# Patient Record
Sex: FEMALE | Race: WHITE | NOT HISPANIC OR LATINO | Employment: UNEMPLOYED | URBAN - NONMETROPOLITAN AREA
[De-identification: names, ages, dates, MRNs, and addresses within clinical notes are randomized per-mention and may not be internally consistent; named-entity substitution may affect disease eponyms.]

---

## 2021-03-03 ENCOUNTER — OFFICE VISIT (OUTPATIENT)
Dept: FAMILY MEDICINE CLINIC | Facility: CLINIC | Age: 41
End: 2021-03-03

## 2021-03-03 ENCOUNTER — LAB (OUTPATIENT)
Dept: LAB | Facility: HOSPITAL | Age: 41
End: 2021-03-03

## 2021-03-03 ENCOUNTER — TELEPHONE (OUTPATIENT)
Dept: FAMILY MEDICINE CLINIC | Facility: CLINIC | Age: 41
End: 2021-03-03

## 2021-03-03 VITALS
HEART RATE: 76 BPM | SYSTOLIC BLOOD PRESSURE: 110 MMHG | DIASTOLIC BLOOD PRESSURE: 74 MMHG | OXYGEN SATURATION: 98 % | HEIGHT: 60 IN | WEIGHT: 124.8 LBS | BODY MASS INDEX: 24.5 KG/M2

## 2021-03-03 DIAGNOSIS — Z30.41 ENCOUNTER FOR SURVEILLANCE OF CONTRACEPTIVE PILLS: ICD-10-CM

## 2021-03-03 DIAGNOSIS — R21 PAPULAR RASH, LOCALIZED: ICD-10-CM

## 2021-03-03 DIAGNOSIS — F41.9 ANXIETY: ICD-10-CM

## 2021-03-03 DIAGNOSIS — G43.809 OTHER MIGRAINE WITHOUT STATUS MIGRAINOSUS, NOT INTRACTABLE: ICD-10-CM

## 2021-03-03 DIAGNOSIS — Z00.00 ANNUAL PHYSICAL EXAM: Primary | ICD-10-CM

## 2021-03-03 DIAGNOSIS — D23.9: ICD-10-CM

## 2021-03-03 DIAGNOSIS — Z12.31 ENCOUNTER FOR SCREENING MAMMOGRAM FOR MALIGNANT NEOPLASM OF BREAST: ICD-10-CM

## 2021-03-03 DIAGNOSIS — Z76.89 ENCOUNTER TO ESTABLISH CARE: ICD-10-CM

## 2021-03-03 DIAGNOSIS — Z00.00 ANNUAL PHYSICAL EXAM: ICD-10-CM

## 2021-03-03 DIAGNOSIS — R63.0 ANOREXIA: ICD-10-CM

## 2021-03-03 LAB
ALBUMIN SERPL-MCNC: 4.4 G/DL (ref 3.5–5.2)
ALBUMIN/GLOB SERPL: 2.1 G/DL
ALP SERPL-CCNC: 50 U/L (ref 39–117)
ALT SERPL W P-5'-P-CCNC: 13 U/L (ref 1–33)
ANION GAP SERPL CALCULATED.3IONS-SCNC: 8.5 MMOL/L (ref 5–15)
AST SERPL-CCNC: 18 U/L (ref 1–32)
BASOPHILS # BLD AUTO: 0.01 10*3/MM3 (ref 0–0.2)
BASOPHILS NFR BLD AUTO: 0.2 % (ref 0–1.5)
BILIRUB SERPL-MCNC: 0.7 MG/DL (ref 0–1.2)
BUN SERPL-MCNC: 16 MG/DL (ref 6–20)
BUN/CREAT SERPL: 20.5 (ref 7–25)
CALCIUM SPEC-SCNC: 8.8 MG/DL (ref 8.6–10.5)
CHLORIDE SERPL-SCNC: 102 MMOL/L (ref 98–107)
CHOLEST SERPL-MCNC: 164 MG/DL (ref 0–200)
CO2 SERPL-SCNC: 26.5 MMOL/L (ref 22–29)
CREAT SERPL-MCNC: 0.78 MG/DL (ref 0.57–1)
DEPRECATED RDW RBC AUTO: 40.2 FL (ref 37–54)
EOSINOPHIL # BLD AUTO: 0.05 10*3/MM3 (ref 0–0.4)
EOSINOPHIL NFR BLD AUTO: 1.1 % (ref 0.3–6.2)
ERYTHROCYTE [DISTWIDTH] IN BLOOD BY AUTOMATED COUNT: 12.2 % (ref 12.3–15.4)
GFR SERPL CREATININE-BSD FRML MDRD: 81 ML/MIN/1.73
GLOBULIN UR ELPH-MCNC: 2.1 GM/DL
GLUCOSE SERPL-MCNC: 86 MG/DL (ref 65–99)
HCT VFR BLD AUTO: 41 % (ref 34–46.6)
HDLC SERPL-MCNC: 88 MG/DL (ref 40–60)
HGB BLD-MCNC: 13.9 G/DL (ref 12–15.9)
IMM GRANULOCYTES # BLD AUTO: 0.02 10*3/MM3 (ref 0–0.05)
IMM GRANULOCYTES NFR BLD AUTO: 0.4 % (ref 0–0.5)
LDLC SERPL CALC-MCNC: 62 MG/DL (ref 0–100)
LDLC/HDLC SERPL: 0.7 {RATIO}
LYMPHOCYTES # BLD AUTO: 1.55 10*3/MM3 (ref 0.7–3.1)
LYMPHOCYTES NFR BLD AUTO: 34.4 % (ref 19.6–45.3)
MCH RBC QN AUTO: 31 PG (ref 26.6–33)
MCHC RBC AUTO-ENTMCNC: 33.9 G/DL (ref 31.5–35.7)
MCV RBC AUTO: 91.3 FL (ref 79–97)
MONOCYTES # BLD AUTO: 0.36 10*3/MM3 (ref 0.1–0.9)
MONOCYTES NFR BLD AUTO: 8 % (ref 5–12)
NEUTROPHILS NFR BLD AUTO: 2.52 10*3/MM3 (ref 1.7–7)
NEUTROPHILS NFR BLD AUTO: 55.9 % (ref 42.7–76)
NRBC BLD AUTO-RTO: 0 /100 WBC (ref 0–0.2)
PLATELET # BLD AUTO: 231 10*3/MM3 (ref 140–450)
PMV BLD AUTO: 9.8 FL (ref 6–12)
POTASSIUM SERPL-SCNC: 4.3 MMOL/L (ref 3.5–5.2)
PROT SERPL-MCNC: 6.5 G/DL (ref 6–8.5)
RBC # BLD AUTO: 4.49 10*6/MM3 (ref 3.77–5.28)
SODIUM SERPL-SCNC: 137 MMOL/L (ref 136–145)
TRIGL SERPL-MCNC: 74 MG/DL (ref 0–150)
VLDLC SERPL-MCNC: 14 MG/DL (ref 5–40)
WBC # BLD AUTO: 4.51 10*3/MM3 (ref 3.4–10.8)

## 2021-03-03 PROCEDURE — 36415 COLL VENOUS BLD VENIPUNCTURE: CPT

## 2021-03-03 PROCEDURE — 80053 COMPREHEN METABOLIC PANEL: CPT

## 2021-03-03 PROCEDURE — 99203 OFFICE O/P NEW LOW 30 MIN: CPT | Performed by: FAMILY MEDICINE

## 2021-03-03 PROCEDURE — 80061 LIPID PANEL: CPT

## 2021-03-03 PROCEDURE — 85025 COMPLETE CBC W/AUTO DIFF WBC: CPT

## 2021-03-03 RX ORDER — FLUOXETINE HYDROCHLORIDE 40 MG/1
40 CAPSULE ORAL DAILY
COMMUNITY
End: 2021-03-03 | Stop reason: SDUPTHER

## 2021-03-03 RX ORDER — SUMATRIPTAN 100 MG/1
100 TABLET, FILM COATED ORAL
Qty: 9 TABLET | Refills: 5 | Status: SHIPPED | OUTPATIENT
Start: 2021-03-03 | End: 2022-05-10 | Stop reason: SDUPTHER

## 2021-03-03 RX ORDER — FLUOXETINE HYDROCHLORIDE 40 MG/1
80 CAPSULE ORAL DAILY
Qty: 60 CAPSULE | Refills: 11 | Status: SHIPPED | OUTPATIENT
Start: 2021-03-03 | End: 2021-11-16

## 2021-03-03 RX ORDER — SUMATRIPTAN 100 MG/1
100 TABLET, FILM COATED ORAL
COMMUNITY
End: 2021-03-03 | Stop reason: SDUPTHER

## 2021-03-03 RX ORDER — ETHYNODIOL DIACETATE AND ETHINYL ESTRADIOL 1 MG-35MCG
1 KIT ORAL DAILY
Qty: 28 TABLET | Refills: 11 | Status: SHIPPED | OUTPATIENT
Start: 2021-03-03 | End: 2022-02-08 | Stop reason: SDUPTHER

## 2021-03-03 RX ORDER — TRIAMCINOLONE ACETONIDE 1 MG/G
CREAM TOPICAL 2 TIMES DAILY
Qty: 15 G | Refills: 1 | Status: SHIPPED | OUTPATIENT
Start: 2021-03-03 | End: 2021-11-16

## 2021-03-03 RX ORDER — ETHYNODIOL DIACETATE AND ETHINYL ESTRADIOL 1 MG-35MCG
1 KIT ORAL DAILY
COMMUNITY
End: 2021-03-03 | Stop reason: SDUPTHER

## 2021-03-03 NOTE — PROGRESS NOTES
Subjective   Chief Complaint   Patient presents with   • Establish Care   • Rash     chest area, between breasts     Yudith Urena is a 40 y.o. year old presenting to establish care as new patient.      Additional Concerns:    Patient presents today to [facility].  She has chronic medical problems including anorexia and migraine headaches.  She is currently taking Prozac to help with anxiety/anorexia symptoms, and this helps.  She still struggles at times.  Has been to outpatient therapy many times in the past, and this has been helpful.  In high school, she went to inpatient treatment center.  Migraine headaches are not very frequent at this time.  She uses Imitrex as needed.      Patient has skin rash under her breasts.  Notes that this started during pregnancy, and has been present ever since.  These sometimes become irritated, especially when patient has a lot of sweating.  She has seen dermatology before, and diagnosed with acantholytic acanthoma.  She would like a second opinion and discussion of possible treatment options.      Past Medical History:   Diagnosis Date   • Anorexia    • Anxiety    • Migraine        Past Surgical History:   Procedure Laterality Date   • KNEE SURGERY           Medications:  Current Outpatient Medications on File Prior to Visit   Medication Sig Dispense Refill   • ethynodiol-ethinyl estradiol (Kelnor 1/35) 1-35 MG-MCG per tablet Take 1 tablet by mouth Daily.     • FLUoxetine (PROzac) 40 MG capsule Take 40 mg by mouth Daily. Patient takes 2 tablets every morning     • SUMAtriptan (IMITREX) 100 MG tablet Take 100 mg by mouth Every 2 (Two) Hours As Needed for Migraine. Take one tablet at onset of headache. May repeat dose one time in 2 hours if headache not relieved.       No current facility-administered medications on file prior to visit.        Allergies   Allergen Reactions   • Acetaminophen Hives   • Iodinated Diagnostic Agents Rash       Family History   Problem  "Relation Age of Onset   • Diabetes Mother        Social History     Socioeconomic History   • Marital status:      Spouse name: Not on file   • Number of children: Not on file   • Years of education: Not on file   • Highest education level: Not on file   Tobacco Use   • Smoking status: Never Smoker   • Smokeless tobacco: Never Used   Substance and Sexual Activity   • Alcohol use: Yes     Frequency: 2-3 times a week     Lives in the home with her father,  and 4 year old daughter  LMP: 6 days ago  Menstrual Periods: Sometimes regular, misses periods  Current birth control: OCPs  Last pap smear was about 2 years - reported as normal.     Exercises regularly: yes, 5 days a week  Wears seat belt:yes.  Concerns about domestic violence: no.    Health Maintenance   Topic Date Due   • TDAP/TD VACCINES (1 - Tdap) 07/10/1997   • INFLUENZA VACCINE  08/01/2020   • PAP SMEAR  03/03/2021       Problem list was reviewed and updated as appropriate.       Objective     /74   Pulse 76   Ht 152.4 cm (60\")   Wt 56.6 kg (124 lb 12.8 oz)   LMP 02/27/2021 (Approximate)   SpO2 98%   BMI 24.37 kg/m²   Physical Exam  Vitals reviewed.   Constitutional:       General: She is not in acute distress.     Appearance: She is well-developed.   HENT:      Head: Normocephalic and atraumatic.   Cardiovascular:      Rate and Rhythm: Normal rate and regular rhythm.      Heart sounds: Normal heart sounds. No murmur heard.     Pulmonary:      Effort: Pulmonary effort is normal. No respiratory distress.      Breath sounds: Normal breath sounds. No wheezing or rales.   Abdominal:      Palpations: Abdomen is soft.      Tenderness: There is no abdominal tenderness.   Musculoskeletal:      Cervical back: Neck supple.   Skin:     General: Skin is warm and dry.      Findings: Rash (several, small erythematous papules present between breasts and around bra line) present.   Neurological:      Mental Status: She is alert and oriented to " person, place, and time.       Lab Review   Remote CMP and CBC from 08/09/2015 reviewed         Assessment/Plan   Diagnoses and all orders for this visit:    1. Annual physical exam (Primary)  -     Lipid Panel; Future  -     CBC & Differential; Future  -     Comprehensive Metabolic Panel; Future    2. Anxiety  -     FLUoxetine (PROzac) 40 MG capsule; Take 2 capsules by mouth Daily. Patient takes 2 tablets every morning  Dispense: 60 capsule; Refill: 11    3. Anorexia    4. Other migraine without status migrainosus, not intractable  -     SUMAtriptan (IMITREX) 100 MG tablet; Take 1 tablet by mouth Every 2 (Two) Hours As Needed for Migraine. Take one tablet at onset of headache. May repeat dose one time in 2 hours if headache persist  Dispense: 9 tablet; Refill: 5    5. Encounter for surveillance of contraceptive pills  -     ethynodiol-ethinyl estradiol (Kelnor 1/35) 1-35 MG-MCG per tablet; Take 1 tablet by mouth Daily.  Dispense: 28 tablet; Refill: 11    6. Encounter for screening mammogram for malignant neoplasm of breast  -     Mammo Screening Digital Tomosynthesis Bilateral With CAD; Future    7. Papular rash, localized  -     Ambulatory Referral to Dermatology  -     triamcinolone (KENALOG) 0.1 % cream; Apply  topically to the appropriate area as directed 2 (Two) Times a Day.  Dispense: 15 g; Refill: 1    8. Acantholytic acanthoma  -     Ambulatory Referral to Dermatology  -     triamcinolone (KENALOG) 0.1 % cream; Apply  topically to the appropriate area as directed 2 (Two) Times a Day.  Dispense: 15 g; Refill: 1    9. Encounter to establish care           Patient presents today to establish care.  Annual exam done today.  Patient's Body mass index is 24.37 kg/m². BMI is within normal parameters. No follow-up required.  She eats healthy diet and has regular exercise 5 days a week.  She works very hard to take care of herself.  She has struggled with anorexia her entire life.  Says that she is in a good place  right now.  Has recognized in the past when she needed additional help or support.  She takes Prozac, which helps with her anxiety and some of the fears she has regarding weight gain.  Will continue this medication.  Migraines infrequent, continue as needed Imitrex.  Papular rash between breast is bothersome.  Diagnosed with acantholytic acanthoma previously.  Would like to see local derm for additional opinion and discussion of treatment options.  Referral placed today.  Can trial triamcinolone cream to help with irritation in the meantime.  Patient dues for mammogram, ordered today.     Return in about 1 year (around 3/3/2022) for Annual physical.           This document has been electronically signed by Angela Holden MD

## 2021-03-04 NOTE — TELEPHONE ENCOUNTER
Please call and let patient know that annual labs look great.  Normal CBC and CMP.  Normal cholesterol, good cholesterol is really high.  ThanksMARK

## 2021-03-09 ENCOUNTER — TELEPHONE (OUTPATIENT)
Dept: FAMILY MEDICINE CLINIC | Facility: CLINIC | Age: 41
End: 2021-03-09

## 2021-03-09 DIAGNOSIS — M54.9 BACK PAIN, UNSPECIFIED BACK LOCATION, UNSPECIFIED BACK PAIN LATERALITY, UNSPECIFIED CHRONICITY: Primary | ICD-10-CM

## 2021-03-09 DIAGNOSIS — M25.511 RIGHT SHOULDER PAIN, UNSPECIFIED CHRONICITY: ICD-10-CM

## 2021-03-10 NOTE — TELEPHONE ENCOUNTER
Called and let patient know referral has been sent.  
PT CALLED AND SAID THAT HER INSURANCE THAT SHE HAS NOW REQUIRES A REFERRAL FOR PHYSICAL THERAPY FOR HER BACK AND RIGHT SHOULDER AND WANTED TO KNOW IF DR. DIAMOND WOULD PUT IN A REFERRAL AND FAX IT [phone] ATTN: SAHRA   
Sure.  Please let patient know that referral will be sent to [facility] rehab.  ThanksMARK  
EENMT

## 2021-04-08 ENCOUNTER — TELEPHONE (OUTPATIENT)
Dept: FAMILY MEDICINE CLINIC | Facility: CLINIC | Age: 41
End: 2021-04-08

## 2021-04-08 NOTE — TELEPHONE ENCOUNTER
Patient called and was wanting to know why she has been scheduled for additional imaging of her breasts.  She stated that she never got results from her first mammogram that was done on 03/24/21.  Please call patient ASAP and let her know what is going on.

## 2021-04-10 NOTE — TELEPHONE ENCOUNTER
Spoke with patient.  Discussed mammogram results.  Patient has already been contacted and set up diagnostic mammogram and ultrasound.  Let patient know typical procedure if spots are still concerning.  Will contact her once additional imaging results return.  Vicente Holden

## 2021-04-22 ENCOUNTER — TELEPHONE (OUTPATIENT)
Dept: FAMILY MEDICINE CLINIC | Facility: CLINIC | Age: 41
End: 2021-04-22

## 2021-04-22 NOTE — TELEPHONE ENCOUNTER
Please call and let patient know that diagnostic mammogram showed benign findings.  Plan to repeat in 1 year.  Thanks, MARK Holden

## 2021-04-23 NOTE — TELEPHONE ENCOUNTER
Per Dr. Holden, ms. Urena has been called with recent Bilateral Screening 3-D Mammogram results & recommendations.  Continue current medications and follow-up as planned or sooner if any problems.

## 2021-05-24 ENCOUNTER — TELEPHONE (OUTPATIENT)
Dept: FAMILY MEDICINE CLINIC | Facility: CLINIC | Age: 41
End: 2021-05-24

## 2021-05-24 DIAGNOSIS — R21 PAPULAR RASH, LOCALIZED: Primary | ICD-10-CM

## 2021-05-24 DIAGNOSIS — D23.9: ICD-10-CM

## 2021-07-07 ENCOUNTER — TELEPHONE (OUTPATIENT)
Dept: FAMILY MEDICINE CLINIC | Facility: CLINIC | Age: 41
End: 2021-07-07

## 2021-07-07 DIAGNOSIS — M54.9 BACK PAIN, UNSPECIFIED BACK LOCATION, UNSPECIFIED BACK PAIN LATERALITY, UNSPECIFIED CHRONICITY: Primary | ICD-10-CM

## 2021-07-07 NOTE — TELEPHONE ENCOUNTER
Needs to have another referral put in for [facility] for her ongoing back pain.   She said they have to have an renewed referral if it's been more than 30 days since her last visit to them.

## 2021-07-07 NOTE — TELEPHONE ENCOUNTER
Per Dr. Holden, Ms. Urena has been called and advise that the requested referral has been sent to [facility]

## 2021-08-16 ENCOUNTER — OFFICE VISIT (OUTPATIENT)
Dept: FAMILY MEDICINE CLINIC | Facility: CLINIC | Age: 41
End: 2021-08-16

## 2021-08-16 VITALS
HEIGHT: 60 IN | HEART RATE: 77 BPM | SYSTOLIC BLOOD PRESSURE: 118 MMHG | OXYGEN SATURATION: 97 % | WEIGHT: 129 LBS | DIASTOLIC BLOOD PRESSURE: 72 MMHG | BODY MASS INDEX: 25.32 KG/M2

## 2021-08-16 DIAGNOSIS — M54.6 CHRONIC BILATERAL THORACIC BACK PAIN: ICD-10-CM

## 2021-08-16 DIAGNOSIS — M62.830 SPASM OF MUSCLE OF LOWER BACK: ICD-10-CM

## 2021-08-16 DIAGNOSIS — G89.29 CHRONIC BILATERAL THORACIC BACK PAIN: ICD-10-CM

## 2021-08-16 DIAGNOSIS — G89.29 CHRONIC RIGHT SHOULDER PAIN: ICD-10-CM

## 2021-08-16 DIAGNOSIS — G89.29 CHRONIC BILATERAL LOW BACK PAIN WITH LEFT-SIDED SCIATICA: Primary | ICD-10-CM

## 2021-08-16 DIAGNOSIS — M54.16 LEFT LUMBAR RADICULOPATHY: ICD-10-CM

## 2021-08-16 DIAGNOSIS — M54.2 NECK PAIN: ICD-10-CM

## 2021-08-16 DIAGNOSIS — M25.511 CHRONIC RIGHT SHOULDER PAIN: ICD-10-CM

## 2021-08-16 DIAGNOSIS — M54.42 CHRONIC BILATERAL LOW BACK PAIN WITH LEFT-SIDED SCIATICA: Primary | ICD-10-CM

## 2021-08-16 PROCEDURE — 99213 OFFICE O/P EST LOW 20 MIN: CPT | Performed by: FAMILY MEDICINE

## 2021-08-16 RX ORDER — CYCLOBENZAPRINE HCL 5 MG
5-10 TABLET ORAL NIGHTLY PRN
Qty: 60 TABLET | Refills: 2 | Status: SHIPPED | OUTPATIENT
Start: 2021-08-16 | End: 2022-02-08

## 2021-08-16 NOTE — PROGRESS NOTES
"Chief Complaint  Back Pain and Joint Pain (ring finger on left hand, joints are painful)    Subjective          Yudith Urena presents to [facility]  History of Present Illness    Patient presents today with complaints of back pain.   This is a chronic problem for this patient.  Patient is involving entirety of the spine.  Has pain in the neck and shoulders, not too much muscle tension in the area.  Sometimes has an ache that radiates from the neck to the right shoulder.  She notes some degree of scoliosis.  She also has some radiation of low back pain into lower extremities, usually left greater than right.  Patient has history of abnormal MRI of lumbar spine in the past, many years ago (?spinal stenosis).  She did have consultation with neurosurgery, but opted for non-surgical management at the time.  Still would prefer no surgery.  She follows with physical therapy every 2 weeks now, and this helps some but pain is only relieved for short period of time.  She has tried epidural injections in the past, usually helped for only a couple of days.  Patient does not desire to be on any chronic pain medications.  She does take Advil PM every other day, as the medication helps with pain relief and sleep.  Sometimes pain interferes with daily activities and affects her moods.  Patient has multiple injuries and overuse related to years of gymnastics and exercise.      Objective   Vital Signs:   /72   Pulse 77   Ht 152.4 cm (60\")   Wt 58.5 kg (129 lb)   SpO2 97%   BMI 25.19 kg/m²     Physical Exam  Vitals reviewed.   Constitutional:       General: She is not in acute distress.     Appearance: She is well-developed.   HENT:      Head: Normocephalic and atraumatic.   Cardiovascular:      Rate and Rhythm: Normal rate and regular rhythm.      Heart sounds: Normal heart sounds. No murmur heard.     Pulmonary:      Effort: Pulmonary effort is normal. No respiratory distress.      " Breath sounds: Normal breath sounds. No wheezing or rales.   Musculoskeletal:      Cervical back: No spasms or tenderness. Normal range of motion.      Thoracic back: Spasms present. No tenderness.      Lumbar back: Spasms and tenderness present.   Skin:     General: Skin is warm and dry.      Findings: No rash.   Neurological:      Mental Status: She is alert and oriented to person, place, and time.        Result Review :   The following data was reviewed by: Angela Holden MD on 08/16/2021:  Common labs    Common Labsle 3/3/21 3/3/21 3/3/21    1022 1022 1022   Glucose   86   BUN   16   Creatinine   0.78   eGFR Non African Am   81   Sodium   137   Potassium   4.3   Chloride   102   Calcium   8.8   Albumin   4.40   Total Bilirubin   0.7   Alkaline Phosphatase   50   AST (SGOT)   18   ALT (SGPT)   13   WBC 4.51     Hemoglobin 13.9     Hematocrit 41.0     Platelets 231     Total Cholesterol  164    Triglycerides  74    HDL Cholesterol  88 (A)    LDL Cholesterol   62    (A) Abnormal value                         Assessment and Plan    Diagnoses and all orders for this visit:    1. Chronic bilateral low back pain with left-sided sciatica (Primary)  -     XR Spine Lumbar 4+ View; Future    2. Left lumbar radiculopathy  -     XR Spine Lumbar 4+ View; Future    3. Spasm of muscle of lower back  -     cyclobenzaprine (FLEXERIL) 5 MG tablet; Take 1-2 tablets by mouth At Night As Needed for Muscle Spasms.  Dispense: 60 tablet; Refill: 2    4. Chronic bilateral thoracic back pain  -     XR Spine Thoracic 3 View (In Office); Future    5. Neck pain  -     XR Spine Cervical 2 or 3 View; Future    6. Chronic right shoulder pain      Patient seen today with significant back pain symptoms, chronic  Pain in all regions of back, worse in cervical and lumbar  Interfering with patient's daily activities and sleep  Only getting short term relief with physical therapy every 2 weeks  She would like to try non surgical options  Will get  Xrays today, plan for MRI of the spine for additional evaluation  Patient may need neurosurgical evaluation based on imaging  If no surgery needed/indicated, patient would likely benefit from pain management to discuss options for treatment of pain  Patient will trial flexeril again at bedtime, 5-10 mg nightly given significant muscle spasm  Concern for cervical radiculopathy causing right shoulder pain  Concern for lumbar radiculopathy causing left lower extremity pain    Patient also has swelling of joints of finger requiring her to wear her wedding ring on opposite hand  Family history of osteoarthritis, no RA that she knows of   Will monitor for the next couple of weeks   Call with worsening/persistent symptoms and labs can be ordered for joint pains (JAYLEEN and Rheumatoid factor)      Follow Up   Return if symptoms worsen or fail to improve, for Next scheduled follow up.  Patient was given instructions and counseling regarding her condition or for health maintenance advice. Please see specific information pulled into the AVS if appropriate.         This document has been electronically signed by Angela Holden MD

## 2021-08-17 ENCOUNTER — TELEPHONE (OUTPATIENT)
Dept: FAMILY MEDICINE CLINIC | Facility: CLINIC | Age: 41
End: 2021-08-17

## 2021-08-17 DIAGNOSIS — M54.42 CHRONIC BILATERAL LOW BACK PAIN WITH LEFT-SIDED SCIATICA: Primary | ICD-10-CM

## 2021-08-17 DIAGNOSIS — G89.29 CHRONIC BILATERAL LOW BACK PAIN WITH LEFT-SIDED SCIATICA: Primary | ICD-10-CM

## 2021-08-17 DIAGNOSIS — M54.6 CHRONIC BILATERAL THORACIC BACK PAIN: ICD-10-CM

## 2021-08-17 DIAGNOSIS — G89.29 CHRONIC BILATERAL THORACIC BACK PAIN: ICD-10-CM

## 2021-08-17 DIAGNOSIS — M25.511 CHRONIC RIGHT SHOULDER PAIN: ICD-10-CM

## 2021-08-17 DIAGNOSIS — G89.29 CHRONIC RIGHT SHOULDER PAIN: ICD-10-CM

## 2021-08-17 DIAGNOSIS — M54.16 LEFT LUMBAR RADICULOPATHY: ICD-10-CM

## 2021-08-17 DIAGNOSIS — M54.2 NECK PAIN: ICD-10-CM

## 2021-08-17 NOTE — TELEPHONE ENCOUNTER
Please let patient know that as expected, Xrays showed degenerative changes.  Orders for MRI of the back have been placed.  She will be contacted for scheduling.  Thanks, MARK Holden

## 2021-08-25 ENCOUNTER — HOSPITAL ENCOUNTER (OUTPATIENT)
Dept: MRI IMAGING | Facility: HOSPITAL | Age: 41
Discharge: HOME | End: 2021-08-25

## 2021-08-25 DIAGNOSIS — M54.42 CHRONIC BILATERAL LOW BACK PAIN WITH LEFT-SIDED SCIATICA: ICD-10-CM

## 2021-08-25 DIAGNOSIS — G89.29 CHRONIC RIGHT SHOULDER PAIN: ICD-10-CM

## 2021-08-25 DIAGNOSIS — G89.29 CHRONIC BILATERAL LOW BACK PAIN WITH LEFT-SIDED SCIATICA: ICD-10-CM

## 2021-08-25 DIAGNOSIS — M25.511 CHRONIC RIGHT SHOULDER PAIN: ICD-10-CM

## 2021-08-25 DIAGNOSIS — M54.6 CHRONIC BILATERAL THORACIC BACK PAIN: ICD-10-CM

## 2021-08-25 DIAGNOSIS — G89.29 CHRONIC BILATERAL THORACIC BACK PAIN: ICD-10-CM

## 2021-08-25 DIAGNOSIS — M54.2 NECK PAIN: ICD-10-CM

## 2021-08-25 DIAGNOSIS — M54.16 LEFT LUMBAR RADICULOPATHY: ICD-10-CM

## 2021-08-25 PROCEDURE — 72141 MRI NECK SPINE W/O DYE: CPT

## 2021-08-25 PROCEDURE — 72148 MRI LUMBAR SPINE W/O DYE: CPT

## 2021-08-25 PROCEDURE — 72146 MRI CHEST SPINE W/O DYE: CPT

## 2021-08-26 ENCOUNTER — TELEPHONE (OUTPATIENT)
Dept: FAMILY MEDICINE CLINIC | Facility: CLINIC | Age: 41
End: 2021-08-26

## 2021-08-26 DIAGNOSIS — M48.02 CERVICAL SPINAL STENOSIS: ICD-10-CM

## 2021-08-26 DIAGNOSIS — M54.6 CHRONIC BILATERAL THORACIC BACK PAIN: ICD-10-CM

## 2021-08-26 DIAGNOSIS — M54.42 CHRONIC BILATERAL LOW BACK PAIN WITH LEFT-SIDED SCIATICA: Primary | ICD-10-CM

## 2021-08-26 DIAGNOSIS — K76.9 LIVER LESION: ICD-10-CM

## 2021-08-26 DIAGNOSIS — M54.2 NECK PAIN: ICD-10-CM

## 2021-08-26 DIAGNOSIS — G89.29 CHRONIC BILATERAL THORACIC BACK PAIN: ICD-10-CM

## 2021-08-26 DIAGNOSIS — M47.816 FACET ARTHROPATHY, LUMBAR: ICD-10-CM

## 2021-08-26 DIAGNOSIS — G89.29 CHRONIC BILATERAL LOW BACK PAIN WITH LEFT-SIDED SCIATICA: Primary | ICD-10-CM

## 2021-08-26 DIAGNOSIS — M54.16 LEFT LUMBAR RADICULOPATHY: ICD-10-CM

## 2021-08-26 NOTE — TELEPHONE ENCOUNTER
Yudith received MRI results and would like a call back regarding what her next steps will need to be.     [phone]

## 2021-08-26 NOTE — TELEPHONE ENCOUNTER
Spoke to patient about MRI results.  Has several areas of disc protrusion and facet arthropathy.  There is some foraminal stenosis compression the L5 nerve and moderate central spinal canal stenosis, contour changes of the cord and foraminal stenosis of C5/6 and C6/7.  Would recommend referral to neurosurgery for discussion of surgical options, patient would like to avoid surgery if possible.  Also referral to pain management for discussion of injections.  Patient does not wish to be on chronic pain medication.  Incidental finding of liver cystic lesion, ultrasound ordered for additional evaluation of this.   Vicente Holden

## 2021-09-13 ENCOUNTER — TELEPHONE (OUTPATIENT)
Dept: FAMILY MEDICINE CLINIC | Facility: CLINIC | Age: 41
End: 2021-09-13

## 2021-09-13 NOTE — TELEPHONE ENCOUNTER
Yudith said, she was referred to [facility] in [place] over 2 weeks ago and no one has called her and she can't get anyone when she calls them. Said, her Insurance wants her to see someone in state too.

## 2021-09-13 NOTE — TELEPHONE ENCOUNTER
Ok, we can change to provider at [facility] in [place]. Thanks, MARK Holden

## 2021-09-16 ENCOUNTER — HOSPITAL ENCOUNTER (OUTPATIENT)
Dept: ULTRASOUND IMAGING | Facility: HOSPITAL | Age: 41
Discharge: HOME | End: 2021-09-16
Admitting: FAMILY MEDICINE

## 2021-09-16 DIAGNOSIS — K76.9 LIVER LESION: ICD-10-CM

## 2021-09-16 PROCEDURE — 76705 ECHO EXAM OF ABDOMEN: CPT

## 2021-09-17 ENCOUNTER — TELEPHONE (OUTPATIENT)
Dept: FAMILY MEDICINE CLINIC | Facility: CLINIC | Age: 41
End: 2021-09-17

## 2021-09-17 NOTE — TELEPHONE ENCOUNTER
PT WOULD LIKE CALL BACK TO DISCUSS US RESULTS. CALLED EARLIER BUT DID NOT REC'D CALL BACK AND DOESN'T WANT TO STRESS ALL WEEKEND.    PLEASE CALL    [phone]

## 2021-09-17 NOTE — TELEPHONE ENCOUNTER
Patient received her results on her liver ultrasound that was done on 9/16/21 and she is concerned and would like to speak to someone in regard of her concern

## 2021-09-20 ENCOUNTER — TELEPHONE (OUTPATIENT)
Dept: FAMILY MEDICINE CLINIC | Facility: CLINIC | Age: 41
End: 2021-09-20

## 2021-09-20 ENCOUNTER — LAB (OUTPATIENT)
Dept: LAB | Facility: HOSPITAL | Age: 41
End: 2021-09-20

## 2021-09-20 DIAGNOSIS — R93.421 ABNORMAL ULTRASOUND OF RIGHT KIDNEY: ICD-10-CM

## 2021-09-20 DIAGNOSIS — R93.421 ABNORMAL ULTRASOUND OF RIGHT KIDNEY: Primary | ICD-10-CM

## 2021-09-20 PROCEDURE — 80069 RENAL FUNCTION PANEL: CPT

## 2021-09-20 PROCEDURE — 36415 COLL VENOUS BLD VENIPUNCTURE: CPT

## 2021-09-20 PROCEDURE — 81001 URINALYSIS AUTO W/SCOPE: CPT

## 2021-09-20 NOTE — TELEPHONE ENCOUNTER
Spoke to patient on 9/17/2021 about concerns.  Benign hemangioma in the liver, no follow-up needed.  Abnormal appearance to the kidney.  Will call back Monday with further instruction for evaluation of this.  Thanks, MARK Holden

## 2021-09-21 ENCOUNTER — TELEPHONE (OUTPATIENT)
Dept: FAMILY MEDICINE CLINIC | Facility: CLINIC | Age: 41
End: 2021-09-21

## 2021-09-21 LAB
ALBUMIN SERPL-MCNC: 4.7 G/DL (ref 3.5–5.2)
ANION GAP SERPL CALCULATED.3IONS-SCNC: 10.9 MMOL/L (ref 5–15)
BACTERIA UR QL AUTO: NORMAL /HPF
BILIRUB UR QL STRIP: NEGATIVE
BUN SERPL-MCNC: 14 MG/DL (ref 6–20)
BUN/CREAT SERPL: 25.5 (ref 7–25)
CALCIUM SPEC-SCNC: 9.1 MG/DL (ref 8.6–10.5)
CHLORIDE SERPL-SCNC: 100 MMOL/L (ref 98–107)
CLARITY UR: CLEAR
CO2 SERPL-SCNC: 25.1 MMOL/L (ref 22–29)
COLOR UR: YELLOW
CREAT SERPL-MCNC: 0.55 MG/DL (ref 0.57–1)
GFR SERPL CREATININE-BSD FRML MDRD: 121 ML/MIN/1.73
GLUCOSE SERPL-MCNC: 81 MG/DL (ref 65–99)
GLUCOSE UR STRIP-MCNC: NEGATIVE MG/DL
HGB UR QL STRIP.AUTO: NEGATIVE
HYALINE CASTS UR QL AUTO: NORMAL /LPF
KETONES UR QL STRIP: NEGATIVE
LEUKOCYTE ESTERASE UR QL STRIP.AUTO: NEGATIVE
NITRITE UR QL STRIP: NEGATIVE
PH UR STRIP.AUTO: 7 [PH] (ref 5–8)
PHOSPHATE SERPL-MCNC: 2.8 MG/DL (ref 2.5–4.5)
POTASSIUM SERPL-SCNC: 4.5 MMOL/L (ref 3.5–5.2)
PROT UR QL STRIP: NEGATIVE
RBC # UR: NORMAL /HPF
REF LAB TEST METHOD: NORMAL
SODIUM SERPL-SCNC: 136 MMOL/L (ref 136–145)
SP GR UR STRIP: 1.01 (ref 1–1.03)
SQUAMOUS #/AREA URNS HPF: NORMAL /HPF
UROBILINOGEN UR QL STRIP: NORMAL
WBC UR QL AUTO: NORMAL /HPF

## 2021-09-21 NOTE — TELEPHONE ENCOUNTER
Please let patient know that kidney function and urinalysis are normal.  Will call with results on ultrasound once this is done.  ThanksMARK

## 2021-09-23 ENCOUNTER — HOSPITAL ENCOUNTER (OUTPATIENT)
Dept: ULTRASOUND IMAGING | Facility: HOSPITAL | Age: 41
Discharge: HOME | End: 2021-09-23
Admitting: FAMILY MEDICINE

## 2021-09-23 ENCOUNTER — TELEPHONE (OUTPATIENT)
Dept: FAMILY MEDICINE CLINIC | Facility: CLINIC | Age: 41
End: 2021-09-23

## 2021-09-23 DIAGNOSIS — R93.421 ABNORMAL ULTRASOUND OF RIGHT KIDNEY: ICD-10-CM

## 2021-09-23 DIAGNOSIS — R93.421 ABNORMAL ULTRASOUND OF RIGHT KIDNEY: Primary | ICD-10-CM

## 2021-09-23 PROCEDURE — 76775 US EXAM ABDO BACK WALL LIM: CPT

## 2021-09-23 NOTE — TELEPHONE ENCOUNTER
Left message letting patient know that renal ultrasound was normal.  This with normal kidney function is reassuring that she does not have any renal issues.  Would recommend that we recheck labs in at least 6 months, and lab order has been placed.  Advised that she can call with additional questions/concerns.  Thanks, MARK Holden

## 2021-10-19 ENCOUNTER — TELEPHONE (OUTPATIENT)
Dept: FAMILY MEDICINE CLINIC | Facility: CLINIC | Age: 41
End: 2021-10-19

## 2021-10-19 DIAGNOSIS — G89.29 CHRONIC BILATERAL LOW BACK PAIN WITH LEFT-SIDED SCIATICA: Primary | ICD-10-CM

## 2021-10-19 DIAGNOSIS — M54.42 CHRONIC BILATERAL LOW BACK PAIN WITH LEFT-SIDED SCIATICA: Primary | ICD-10-CM

## 2021-11-16 ENCOUNTER — LAB (OUTPATIENT)
Dept: LAB | Facility: HOSPITAL | Age: 41
End: 2021-11-16

## 2021-11-16 ENCOUNTER — TRANSCRIBE ORDERS (OUTPATIENT)
Dept: LAB | Facility: HOSPITAL | Age: 41
End: 2021-11-16

## 2021-11-16 ENCOUNTER — TRANSCRIBE ORDERS (OUTPATIENT)
Dept: CARDIOLOGY | Facility: CLINIC | Age: 41
End: 2021-11-16

## 2021-11-16 ENCOUNTER — OFFICE VISIT (OUTPATIENT)
Dept: FAMILY MEDICINE CLINIC | Facility: CLINIC | Age: 41
End: 2021-11-16

## 2021-11-16 ENCOUNTER — HOSPITAL ENCOUNTER (OUTPATIENT)
Dept: GENERAL RADIOLOGY | Facility: HOSPITAL | Age: 41
Discharge: HOME | End: 2021-11-16

## 2021-11-16 ENCOUNTER — TRANSCRIBE ORDERS (OUTPATIENT)
Dept: LAB | Facility: OTHER | Age: 41
End: 2021-11-16

## 2021-11-16 VITALS
OXYGEN SATURATION: 97 % | DIASTOLIC BLOOD PRESSURE: 80 MMHG | WEIGHT: 131 LBS | HEART RATE: 88 BPM | HEIGHT: 60 IN | SYSTOLIC BLOOD PRESSURE: 106 MMHG | BODY MASS INDEX: 25.72 KG/M2

## 2021-11-16 DIAGNOSIS — F41.9 ANXIETY: Primary | ICD-10-CM

## 2021-11-16 DIAGNOSIS — G89.29 CHRONIC BILATERAL LOW BACK PAIN, UNSPECIFIED WHETHER SCIATICA PRESENT: ICD-10-CM

## 2021-11-16 DIAGNOSIS — Z01.818 PRE-OP TESTING: Primary | ICD-10-CM

## 2021-11-16 DIAGNOSIS — Z01.818 PREOP EXAMINATION: ICD-10-CM

## 2021-11-16 DIAGNOSIS — M43.16 SPONDYLOLISTHESIS OF LUMBAR REGION: ICD-10-CM

## 2021-11-16 DIAGNOSIS — Z01.818 PREOP EXAMINATION: Primary | ICD-10-CM

## 2021-11-16 DIAGNOSIS — Z01.818 PRE-OP TESTING: ICD-10-CM

## 2021-11-16 DIAGNOSIS — M54.50 CHRONIC BILATERAL LOW BACK PAIN, UNSPECIFIED WHETHER SCIATICA PRESENT: ICD-10-CM

## 2021-11-16 DIAGNOSIS — M89.8X1 CLAVICLE PAIN: ICD-10-CM

## 2021-11-16 DIAGNOSIS — M54.16 LEFT LUMBAR RADICULOPATHY: ICD-10-CM

## 2021-11-16 LAB
ANION GAP SERPL CALCULATED.3IONS-SCNC: 10.1 MMOL/L (ref 5–15)
APTT PPP: 28.6 SECONDS (ref 20–40.3)
BASOPHILS # BLD AUTO: 0.03 10*3/MM3 (ref 0–0.2)
BASOPHILS NFR BLD AUTO: 0.4 % (ref 0–1.5)
BUN SERPL-MCNC: 14 MG/DL (ref 6–20)
BUN/CREAT SERPL: 20.3 (ref 7–25)
CALCIUM SPEC-SCNC: 9.2 MG/DL (ref 8.6–10.5)
CHLORIDE SERPL-SCNC: 104 MMOL/L (ref 98–107)
CO2 SERPL-SCNC: 24.9 MMOL/L (ref 22–29)
CREAT SERPL-MCNC: 0.69 MG/DL (ref 0.57–1)
DEPRECATED RDW RBC AUTO: 43.3 FL (ref 37–54)
EOSINOPHIL # BLD AUTO: 0.09 10*3/MM3 (ref 0–0.4)
EOSINOPHIL NFR BLD AUTO: 1.3 % (ref 0.3–6.2)
ERYTHROCYTE [DISTWIDTH] IN BLOOD BY AUTOMATED COUNT: 12.4 % (ref 12.3–15.4)
ERYTHROCYTE [SEDIMENTATION RATE] IN BLOOD: 3 MM/HR (ref 0–20)
GFR SERPL CREATININE-BSD FRML MDRD: 93 ML/MIN/1.73
GLUCOSE SERPL-MCNC: 69 MG/DL (ref 65–99)
HCT VFR BLD AUTO: 44.4 % (ref 34–46.6)
HGB BLD-MCNC: 14.1 G/DL (ref 12–15.9)
IMM GRANULOCYTES # BLD AUTO: 0.02 10*3/MM3 (ref 0–0.05)
IMM GRANULOCYTES NFR BLD AUTO: 0.3 % (ref 0–0.5)
INR PPP: 0.91 (ref 0.8–1.2)
LYMPHOCYTES # BLD AUTO: 1.73 10*3/MM3 (ref 0.7–3.1)
LYMPHOCYTES NFR BLD AUTO: 24.7 % (ref 19.6–45.3)
MCH RBC QN AUTO: 29.9 PG (ref 26.6–33)
MCHC RBC AUTO-ENTMCNC: 31.8 G/DL (ref 31.5–35.7)
MCV RBC AUTO: 94.3 FL (ref 79–97)
MONOCYTES # BLD AUTO: 0.5 10*3/MM3 (ref 0.1–0.9)
MONOCYTES NFR BLD AUTO: 7.2 % (ref 5–12)
NEUTROPHILS NFR BLD AUTO: 4.62 10*3/MM3 (ref 1.7–7)
NEUTROPHILS NFR BLD AUTO: 66.1 % (ref 42.7–76)
NRBC BLD AUTO-RTO: 0.1 /100 WBC (ref 0–0.2)
PLATELET # BLD AUTO: 258 10*3/MM3 (ref 140–450)
PMV BLD AUTO: 10.3 FL (ref 6–12)
POTASSIUM SERPL-SCNC: 4 MMOL/L (ref 3.5–5.2)
PROTHROMBIN TIME: 12.2 SECONDS (ref 11.1–15.3)
RBC # BLD AUTO: 4.71 10*6/MM3 (ref 3.77–5.28)
SODIUM SERPL-SCNC: 139 MMOL/L (ref 136–145)
WBC # BLD AUTO: 6.99 10*3/MM3 (ref 3.4–10.8)

## 2021-11-16 PROCEDURE — 71046 X-RAY EXAM CHEST 2 VIEWS: CPT

## 2021-11-16 PROCEDURE — 80048 BASIC METABOLIC PNL TOTAL CA: CPT

## 2021-11-16 PROCEDURE — 36415 COLL VENOUS BLD VENIPUNCTURE: CPT

## 2021-11-16 PROCEDURE — 85730 THROMBOPLASTIN TIME PARTIAL: CPT

## 2021-11-16 PROCEDURE — 85610 PROTHROMBIN TIME: CPT

## 2021-11-16 PROCEDURE — 85652 RBC SED RATE AUTOMATED: CPT

## 2021-11-16 PROCEDURE — 85025 COMPLETE CBC W/AUTO DIFF WBC: CPT

## 2021-11-16 PROCEDURE — 93000 ELECTROCARDIOGRAM COMPLETE: CPT | Performed by: INTERNAL MEDICINE

## 2021-11-16 PROCEDURE — 99213 OFFICE O/P EST LOW 20 MIN: CPT | Performed by: FAMILY MEDICINE

## 2021-11-16 RX ORDER — FLUOXETINE HYDROCHLORIDE 20 MG/1
20 CAPSULE ORAL DAILY
Qty: 90 CAPSULE | Refills: 1 | Status: SHIPPED | OUTPATIENT
Start: 2021-11-16 | End: 2022-05-16

## 2021-11-17 ENCOUNTER — PATIENT MESSAGE (OUTPATIENT)
Dept: FAMILY MEDICINE CLINIC | Facility: CLINIC | Age: 41
End: 2021-11-17

## 2021-11-30 LAB
QT INTERVAL: 378 MS
QTC INTERVAL: 425 MS

## 2022-01-10 ENCOUNTER — TELEPHONE (OUTPATIENT)
Dept: FAMILY MEDICINE CLINIC | Facility: CLINIC | Age: 42
End: 2022-01-10

## 2022-01-10 DIAGNOSIS — M25.511 CHRONIC RIGHT SHOULDER PAIN: Primary | ICD-10-CM

## 2022-01-10 DIAGNOSIS — G89.29 CHRONIC RIGHT SHOULDER PAIN: Primary | ICD-10-CM

## 2022-02-08 ENCOUNTER — OFFICE VISIT (OUTPATIENT)
Dept: FAMILY MEDICINE CLINIC | Facility: CLINIC | Age: 42
End: 2022-02-08

## 2022-02-08 VITALS
OXYGEN SATURATION: 98 % | HEIGHT: 60 IN | SYSTOLIC BLOOD PRESSURE: 122 MMHG | HEART RATE: 95 BPM | BODY MASS INDEX: 25.58 KG/M2 | RESPIRATION RATE: 18 BRPM | DIASTOLIC BLOOD PRESSURE: 80 MMHG

## 2022-02-08 DIAGNOSIS — M89.8X1 CLAVICLE PAIN: ICD-10-CM

## 2022-02-08 DIAGNOSIS — G89.29 CHRONIC BILATERAL LOW BACK PAIN, UNSPECIFIED WHETHER SCIATICA PRESENT: Primary | ICD-10-CM

## 2022-02-08 DIAGNOSIS — Z30.41 ENCOUNTER FOR SURVEILLANCE OF CONTRACEPTIVE PILLS: ICD-10-CM

## 2022-02-08 DIAGNOSIS — M54.50 CHRONIC BILATERAL LOW BACK PAIN, UNSPECIFIED WHETHER SCIATICA PRESENT: Primary | ICD-10-CM

## 2022-02-08 DIAGNOSIS — F41.9 ANXIETY: ICD-10-CM

## 2022-02-08 PROCEDURE — 99213 OFFICE O/P EST LOW 20 MIN: CPT | Performed by: FAMILY MEDICINE

## 2022-02-08 RX ORDER — MELATONIN
1000
COMMUNITY

## 2022-02-08 RX ORDER — CYCLOBENZAPRINE HCL 10 MG
TABLET ORAL
COMMUNITY
Start: 2021-12-10 | End: 2022-05-16 | Stop reason: SDUPTHER

## 2022-02-08 RX ORDER — ETHYNODIOL DIACETATE AND ETHINYL ESTRADIOL 1 MG-35MCG
1 KIT ORAL DAILY
Qty: 28 TABLET | Refills: 11 | Status: SHIPPED | OUTPATIENT
Start: 2022-02-08 | End: 2023-01-30

## 2022-02-08 RX ORDER — SUMATRIPTAN 100 MG/1
33-100 TABLET, FILM COATED ORAL
COMMUNITY
Start: 2021-09-22 | End: 2022-02-08

## 2022-02-08 NOTE — PROGRESS NOTES
"Chief Complaint  Follow-up and Med Refill    Subjective          Yudith Urena presents to [facility] [place]  History of Present Illness    Patient seen today for follow up.  Doing well.  She had surgery on her lumbar spine, significant improvement in pain and lower extremity symptoms since this time.  She continues to use flexeril as needed.  Has started getting back into physical activity gradually.  She has weaned herself down to Prozac 20 mg daily, and feels like she is doing well on this dose.    Objective   Vital Signs:   /80   Pulse 95   Resp 18   Ht 152.4 cm (60\")   SpO2 98%   BMI 25.58 kg/m²     Physical Exam  Vitals reviewed.   Constitutional:       General: She is not in acute distress.     Appearance: She is well-developed.   Cardiovascular:      Rate and Rhythm: Normal rate and regular rhythm.      Heart sounds: Normal heart sounds. No murmur heard.      Pulmonary:      Effort: Pulmonary effort is normal. No respiratory distress.      Breath sounds: Normal breath sounds. No wheezing or rales.   Skin:     General: Skin is warm and dry.   Neurological:      Mental Status: She is alert and oriented to person, place, and time.        Result Review :   The following data was reviewed by: Angela Holden MD on 02/08/2022:  Common labs    Common Labsle 9/20/21 11/16/21 11/16/21     1109 1109   Glucose 81  69   BUN 14  14   Creatinine 0.55 (A)  0.69   eGFR Non African Am 121  93   Sodium 136  139   Potassium 4.5  4.0   Chloride 100  104   Calcium 9.1  9.2   Albumin 4.70     WBC  6.99    Hemoglobin  14.1    Hematocrit  44.4    Platelets  258    (A) Abnormal value                      Assessment and Plan    Diagnoses and all orders for this visit:    1. Chronic bilateral low back pain, unspecified whether sciatica present (Primary)    2. Anxiety    3. Clavicle pain    4. Encounter for surveillance of contraceptive pills  -     ethynodiol-ethinyl " estradiol (Kelnor 1/35) 1-35 MG-MCG per tablet; Take 1 tablet by mouth Daily.  Dispense: 28 tablet; Refill: 11      Back pain and lumbar radiculopathy improved since surgical intervention, no new concerns  Working with physical therapy on clavicle pain coming from rotated right clavicle  Anxiety controlled with Prozac 20 mg daily  Refill of contraceptive provided today  Patient due for annual physical and pap smear - plan to complete at next visit          Follow Up   Return in about 6 months (around 8/8/2022) for Annual physical with pap smear.  Patient was given instructions and counseling regarding her condition or for health maintenance advice. Please see specific information pulled into the AVS if appropriate.           This document has been electronically signed by Angela Holden MD

## 2022-05-03 ENCOUNTER — TELEPHONE (OUTPATIENT)
Dept: FAMILY MEDICINE CLINIC | Facility: CLINIC | Age: 42
End: 2022-05-03

## 2022-05-03 DIAGNOSIS — Z00.00 ANNUAL PHYSICAL EXAM: Primary | ICD-10-CM

## 2022-05-03 NOTE — TELEPHONE ENCOUNTER
Please let patient know if you want her to do labs before her appt or the day of appt. There are no orders in the patient [phone]

## 2022-05-03 NOTE — TELEPHONE ENCOUNTER
Next Cele OV 05/16/2022    The only active order I see is for a Renal Function Panel ordered on 09/23/2021    Please Advise if any other labs need to be ordered or if you want to reorder to assure that the lab is completed?

## 2022-05-04 NOTE — TELEPHONE ENCOUNTER
Blood work after is fine in case we need to check on something specific after discussing.  Thanks, MARK Holden

## 2022-05-10 DIAGNOSIS — G43.809 OTHER MIGRAINE WITHOUT STATUS MIGRAINOSUS, NOT INTRACTABLE: ICD-10-CM

## 2022-05-10 RX ORDER — SUMATRIPTAN 100 MG/1
100 TABLET, FILM COATED ORAL
Qty: 9 TABLET | Refills: 5 | Status: SHIPPED | OUTPATIENT
Start: 2022-05-10 | End: 2022-05-16

## 2022-05-15 DIAGNOSIS — F41.9 ANXIETY: ICD-10-CM

## 2022-05-16 ENCOUNTER — OFFICE VISIT (OUTPATIENT)
Dept: FAMILY MEDICINE CLINIC | Facility: CLINIC | Age: 42
End: 2022-05-16

## 2022-05-16 VITALS
DIASTOLIC BLOOD PRESSURE: 76 MMHG | HEIGHT: 60 IN | HEART RATE: 76 BPM | SYSTOLIC BLOOD PRESSURE: 130 MMHG | OXYGEN SATURATION: 97 % | BODY MASS INDEX: 26.5 KG/M2 | WEIGHT: 135 LBS

## 2022-05-16 DIAGNOSIS — G89.29 CHRONIC BILATERAL LOW BACK PAIN, UNSPECIFIED WHETHER SCIATICA PRESENT: ICD-10-CM

## 2022-05-16 DIAGNOSIS — F41.9 ANXIETY: ICD-10-CM

## 2022-05-16 DIAGNOSIS — M25.511 CHRONIC RIGHT SHOULDER PAIN: Primary | ICD-10-CM

## 2022-05-16 DIAGNOSIS — M25.511 ARTHRALGIA OF RIGHT ACROMIOCLAVICULAR JOINT: ICD-10-CM

## 2022-05-16 DIAGNOSIS — G89.29 CHRONIC RIGHT SHOULDER PAIN: Primary | ICD-10-CM

## 2022-05-16 DIAGNOSIS — M54.50 CHRONIC BILATERAL LOW BACK PAIN, UNSPECIFIED WHETHER SCIATICA PRESENT: ICD-10-CM

## 2022-05-16 DIAGNOSIS — G43.809 OTHER MIGRAINE WITHOUT STATUS MIGRAINOSUS, NOT INTRACTABLE: ICD-10-CM

## 2022-05-16 PROCEDURE — 99214 OFFICE O/P EST MOD 30 MIN: CPT | Performed by: FAMILY MEDICINE

## 2022-05-16 RX ORDER — SUMATRIPTAN 25 MG/1
25 TABLET, FILM COATED ORAL ONCE
Qty: 9 TABLET | Refills: 5 | Status: SHIPPED | OUTPATIENT
Start: 2022-05-16 | End: 2022-05-16

## 2022-05-16 RX ORDER — FLUOXETINE HYDROCHLORIDE 20 MG/1
CAPSULE ORAL
Qty: 90 CAPSULE | Refills: 0 | Status: SHIPPED | OUTPATIENT
Start: 2022-05-16 | End: 2022-05-16 | Stop reason: SDUPTHER

## 2022-05-16 RX ORDER — FLUOXETINE HYDROCHLORIDE 20 MG/1
20 CAPSULE ORAL DAILY
Qty: 90 CAPSULE | Refills: 3 | Status: SHIPPED | OUTPATIENT
Start: 2022-05-16

## 2022-05-16 RX ORDER — CYCLOBENZAPRINE HCL 10 MG
10 TABLET ORAL NIGHTLY PRN
Qty: 30 TABLET | Refills: 2 | Status: SHIPPED | OUTPATIENT
Start: 2022-05-16 | End: 2023-02-09

## 2022-05-16 NOTE — PROGRESS NOTES
"Chief Complaint  Shoulder Pain    Subjective    History of Present Illness {  Problem List  Visit  Diagnosis   Encounters  Notes  Medications  Labs  Result Review Imaging  Media :23}     Yudith rUena presents to [facility] [place] for     Chief Complaint   Patient presents with   • Shoulder Pain      Patient seen today for shoulder pain.  Chronic problem, seems to be getting worse.  Exacerbated by overhead movements with her arms.   Pain sometimes keeps her up at night.  She regularly attends physical therapy to help with musculoskeletal complaints.  Also does hot yoga and low impact strengthening exercises regularly.  She is also working with therapist to help change the way she deals with food and weight, history of anorexia.        Current Outpatient Medications:   •  cholecalciferol (VITAMIN D3) 25 MCG (1000 UT) tablet, Take 1,000 Units by mouth., Disp: , Rfl:   •  ethynodiol-ethinyl estradiol (Kelnor 1/35) 1-35 MG-MCG per tablet, Take 1 tablet by mouth Daily., Disp: 28 tablet, Rfl: 11  •  FLUoxetine (PROzac) 20 MG capsule, Take 1 capsule by mouth once daily, Disp: 90 capsule, Rfl: 0  •  SUMAtriptan (IMITREX) 100 MG tablet, Take 1 tablet by mouth Every 2 (Two) Hours As Needed for Migraine. Take one tablet at onset of headache. May repeat dose one time in 2 hours if headache persist, Disp: 9 tablet, Rfl: 5  •  cyclobenzaprine (FLEXERIL) 10 MG tablet, , Disp: , Rfl:      Objective       Vital Signs:   /76   Pulse 76   Ht 152.4 cm (60\")   Wt 61.2 kg (135 lb)   SpO2 97%   BMI 26.37 kg/m²     Physical Exam  Vitals reviewed.   Constitutional:       General: She is not in acute distress.     Appearance: She is well-developed.   Cardiovascular:      Rate and Rhythm: Normal rate and regular rhythm.      Heart sounds: Normal heart sounds. No murmur heard.  Pulmonary:      Effort: Pulmonary effort is normal. No respiratory distress.      Breath " sounds: Normal breath sounds. No wheezing.   Musculoskeletal:      Right shoulder: Bony tenderness (AC joint) present.   Skin:     General: Skin is warm and dry.   Neurological:      Mental Status: She is alert and oriented to person, place, and time.        Result Review :{ Labs  Result Review  Imaging  Med Tab  Media :23}   The following data was reviewed by: Angela Holden MD on 05/16/2022    Common labs    Common Labsle 9/20/21 11/16/21 11/16/21     1109 1109   Glucose 81  69   BUN 14  14   Creatinine 0.55 (A)  0.69   eGFR Non African Am 121  93   Sodium 136  139   Potassium 4.5  4.0   Chloride 100  104   Calcium 9.1  9.2   Albumin 4.70     WBC  6.99    Hemoglobin  14.1    Hematocrit  44.4    Platelets  258    (A) Abnormal value                        Assessment and Plan {CC Problem List  Visit Diagnosis  ROS  Review (Popup)  Health Maintenance  Quality  BestPractice  Medications  SmartSets  SnapShot Encounters  Media :23}   Diagnoses and all orders for this visit:    1. Chronic right shoulder pain (Primary)  -     XR Shoulder 2+ View Right; Future    2. Arthralgia of right acromioclavicular joint  -     XR Shoulder 2+ View Right; Future    3. Anxiety  -     FLUoxetine (PROzac) 20 MG capsule; Take 1 capsule by mouth Daily.  Dispense: 90 capsule; Refill: 3    4. Other migraine without status migrainosus, not intractable  -     SUMAtriptan (Imitrex) 25 MG tablet; Take 1 tablet by mouth 1 (One) Time for 1 dose. Take one tablet at onset of headache. May repeat dose one time in 2 hours if headache not relieved.  Dispense: 9 tablet; Refill: 5    5. Chronic bilateral low back pain, unspecified whether sciatica present  -     cyclobenzaprine (FLEXERIL) 10 MG tablet; Take 1 tablet by mouth At Night As Needed for Muscle Spasms.  Dispense: 30 tablet; Refill: 2      Patient seen today for follow up  Acute on chronic right shoulder pain  AC tenderness  Will obtain Xray for evaluation  Discussed  possibility of ortho referral for steroid injection if significant degenerative changes  Prozac helping with anxiety  Patient has also been following with counselor  Also finding benefit in hot yoga classes 3 times a week  Continue using Imitrex as needed for migraines, infrequent  Chronic bilateral low back pain, musculoskeletal in nature  Trial of flexeril at bedtime as needed      Follow Up {Instructions Charge Capture  Follow-up Communications :23}   Return if symptoms worsen or fail to improve, for Next scheduled follow up.  Patient was given instructions and counseling regarding her condition or for health maintenance advice. Please see specific information pulled into the AVS if appropriate.            This document has been electronically signed by Angela Holden MD

## 2022-05-18 ENCOUNTER — TELEPHONE (OUTPATIENT)
Dept: FAMILY MEDICINE CLINIC | Facility: CLINIC | Age: 42
End: 2022-05-18

## 2022-05-18 DIAGNOSIS — G89.29 CHRONIC RIGHT SHOULDER PAIN: ICD-10-CM

## 2022-05-18 DIAGNOSIS — M25.511 ARTHRALGIA OF RIGHT ACROMIOCLAVICULAR JOINT: Primary | ICD-10-CM

## 2022-05-18 DIAGNOSIS — M25.511 CHRONIC RIGHT SHOULDER PAIN: ICD-10-CM

## 2022-05-18 NOTE — TELEPHONE ENCOUNTER
Per Dr. Holden, Ms. Urena has been called with recent x-ray results and recommendations.     Dr. Holden  She was asking about the referral to Dr. Isbell for possible steroid injection as recommended by Physical Therapy.  She said that is what Kalpana Bradford recommended she do is get the joint injection.   Do you want to put in the referral?

## 2022-05-18 NOTE — TELEPHONE ENCOUNTER
Please let patient know that Xray did not show any significant arthritis, so I would recommend continuation of physical therapy and activity modification as she has been doing.  May try ice or voltaren gel right on specific part of anterior shoulder that bothers her.  Thanks, MARK Holden

## 2022-06-14 ENCOUNTER — OFFICE VISIT (OUTPATIENT)
Dept: ORTHOPEDIC SURGERY | Facility: CLINIC | Age: 42
End: 2022-06-14

## 2022-06-14 VITALS — HEIGHT: 60 IN | BODY MASS INDEX: 26.8 KG/M2 | WEIGHT: 136.5 LBS

## 2022-06-14 DIAGNOSIS — M75.101 ROTATOR CUFF SYNDROME, RIGHT: Primary | ICD-10-CM

## 2022-06-14 DIAGNOSIS — M25.511 CHRONIC RIGHT SHOULDER PAIN: ICD-10-CM

## 2022-06-14 DIAGNOSIS — G89.29 CHRONIC RIGHT SHOULDER PAIN: ICD-10-CM

## 2022-06-14 PROCEDURE — 99204 OFFICE O/P NEW MOD 45 MIN: CPT | Performed by: ORTHOPAEDIC SURGERY

## 2022-06-14 RX ORDER — MELOXICAM 15 MG/1
TABLET ORAL
Qty: 30 TABLET | Refills: 3 | Status: SHIPPED | OUTPATIENT
Start: 2022-06-14 | End: 2022-10-14

## 2022-06-14 NOTE — PROGRESS NOTES
Yudith Urena is a 41 y.o. female   Primary provider:  Angela Holden MD       Chief Complaint   Patient presents with   • Right Shoulder - Pain       HISTORY OF PRESENT ILLNESS:   Patient is here today for right shoulder pain. She had xrays prior to visit.   Patient states that she has been having pain in her right shoulder for a couple of years.  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  She states that she has been doing more yoga and has stopped doing the overhead activity and her symptoms have improved some.  She did gymnastics for a long period of time and has some shoulder soreness since then.  She states that she had an L5-S1 fusion last winter and has changed her activity level due to that.  She reports occasional sharp/pinching pain that is intermittent.  She has done physical therapy off and on for the last couple of years and is now doing exercises with her therapist 1 time every couple of weeks.  No specific injury.  Advil does seem to help some but she does not take it regularly and is only taking it intermittently.    Pain  This is a chronic problem. The current episode started more than 1 year ago. The problem occurs intermittently. The problem has been waxing and waning. Associated symptoms include joint swelling. The symptoms are aggravated by exertion. She has tried ice and rest for the symptoms. The treatment provided mild relief.        CONCURRENT MEDICAL HISTORY:    Past Medical History:   Diagnosis Date   • Anorexia    • Anxiety    • Migraine        Allergies   Allergen Reactions   • Acetaminophen Hives and Rash   • Iodinated Diagnostic Agents Rash         Current Outpatient Medications:   •  ethynodiol-ethinyl estradiol (Kelnor 1/35) 1-35 MG-MCG per tablet, Take 1 tablet by mouth Daily., Disp: 28 tablet, Rfl: 11  •  FLUoxetine (PROzac) 20 MG capsule, Take 1 capsule by mouth Daily., Disp: 90 capsule, Rfl:  "3  •  cholecalciferol (VITAMIN D3) 25 MCG (1000 UT) tablet, Take 1,000 Units by mouth., Disp: , Rfl:   •  cyclobenzaprine (FLEXERIL) 10 MG tablet, Take 1 tablet by mouth At Night As Needed for Muscle Spasms., Disp: 30 tablet, Rfl: 2  •  meloxicam (MOBIC) 15 MG tablet, 1 PO Daily with food., Disp: 30 tablet, Rfl: 3  •  SUMAtriptan (Imitrex) 25 MG tablet, Take 1 tablet by mouth 1 (One) Time for 1 dose. Take one tablet at onset of headache. May repeat dose one time in 2 hours if headache not relieved., Disp: 9 tablet, Rfl: 5    Past Surgical History:   Procedure Laterality Date   • KNEE SURGERY         Family History   Problem Relation Age of Onset   • Diabetes Mother         Social History     Socioeconomic History   • Marital status:    Tobacco Use   • Smoking status: Never Smoker   • Smokeless tobacco: Never Used   Vaping Use   • Vaping Use: Never used   Substance and Sexual Activity   • Alcohol use: Yes     Alcohol/week: 4.0 standard drinks     Types: 4 Glasses of wine per week   • Drug use: Never   • Sexual activity: Defer        Review of Systems   Constitutional: Negative.    HENT: Negative.    Eyes: Negative.    Respiratory: Negative.    Cardiovascular: Negative.    Gastrointestinal: Negative.    Endocrine:        Night sweats   Genitourinary: Negative.    Musculoskeletal: Positive for joint swelling.   Skin: Negative.    Allergic/Immunologic: Negative.    Neurological: Negative.    Psychiatric/Behavioral: Positive for sleep disturbance.       PHYSICAL EXAMINATION:       Ht 152.4 cm (60\")   Wt 61.9 kg (136 lb 8 oz)   BMI 26.66 kg/m²     Physical Exam  Constitutional:       General: She is not in acute distress.     Appearance: Normal appearance.   Pulmonary:      Effort: Pulmonary effort is normal. No respiratory distress.   Neurological:      Mental Status: She is alert and oriented to person, place, and time.         GAIT:     []  Normal  []  Antalgic    Assistive device: []  None  []  Walker     []  " Crutches  []  Cane     []  Wheelchair  []  Stretcher    Right Shoulder Exam     Tenderness   The patient is experiencing tenderness in the acromioclavicular joint (Mild tenderness over the coracoid process).    Range of Motion   Active abduction: 160   Forward flexion: 180     Muscle Strength   Abduction: 4/5     Tests   Ochoa test: positive  Cross arm: positive  Impingement: negative    Other   Erythema: absent  Sensation: normal  Pulse: present                XR Shoulder 2+ View Right  Order date: 5/16/2022  Authorizing: Angela Holden MD  Ordered by Angela Holden MD on 5/16/2022.       Narrative & Impression    Right shoulder three views May 16, 2022     INDICATION: Pain     FINDINGS:  Bones normally mineralized.  No fracture or dislocation.  No focal bony lesions.  No significant soft tissue abnormality.     IMPRESSION:  Grossly normal plain films right shoulder     Electronically signed by:  Ruben Holley MD  5/17/2022 10:12 AM  CDT Workstation: [id number]             ASSESSMENT:    Diagnoses and all orders for this visit:    Rotator cuff syndrome, right    Chronic right shoulder pain    Other orders  -     meloxicam (MOBIC) 15 MG tablet; 1 PO Daily with food.          PLAN    Patient has good range of motion but does have some special testing signs for rotator cuff syndrome.  We discussed the possibility of further evaluation with MRI, however, we both agreed that we would try a dedicated anti-inflammatory medication first.  She will begin meloxicam for anti-inflammatory medication.  She will continue with intermittent use of physical therapy and continued home exercise program.  She will also continue with modified  Activity.  We discussed possibility of steroid injection and the possibility of MRI of the right shoulder depending on how her symptoms change.  Follow-up 6 weeks    Return in about 6 weeks (around 7/26/2022) for recheck.    Yuri Isbell MD

## 2022-07-26 ENCOUNTER — TELEPHONE (OUTPATIENT)
Dept: ORTHOPEDIC SURGERY | Facility: CLINIC | Age: 42
End: 2022-07-26

## 2022-07-26 NOTE — TELEPHONE ENCOUNTER
BETTY,    Patient stated her shoulder is doing better with the anti-inflammatory. She has two refills and wants to know if you will give her more refills for Meloxicam 15 MG.

## 2022-07-27 ENCOUNTER — TELEPHONE (OUTPATIENT)
Dept: ORTHOPEDIC SURGERY | Facility: CLINIC | Age: 42
End: 2022-07-27

## 2022-09-01 ENCOUNTER — PATIENT MESSAGE (OUTPATIENT)
Dept: FAMILY MEDICINE CLINIC | Facility: CLINIC | Age: 42
End: 2022-09-01

## 2022-09-01 DIAGNOSIS — R23.2 HOT FLASHES: Primary | ICD-10-CM

## 2022-09-01 DIAGNOSIS — Z00.00 ANNUAL PHYSICAL EXAM: ICD-10-CM

## 2022-09-08 RX ORDER — VALACYCLOVIR HYDROCHLORIDE 1 G/1
2000 TABLET, FILM COATED ORAL 2 TIMES DAILY
Qty: 4 TABLET | Refills: 5 | Status: SHIPPED | OUTPATIENT
Start: 2022-09-08 | End: 2022-09-09

## 2022-10-14 RX ORDER — MELOXICAM 15 MG/1
TABLET ORAL
Qty: 30 TABLET | Refills: 0 | Status: SHIPPED | OUTPATIENT
Start: 2022-10-14 | End: 2022-11-16

## 2022-10-14 NOTE — TELEPHONE ENCOUNTER
RX requesting meloxicam (mobic)   Last filled 6/14/2022 with 3 refills  Last office visit 6/14/2022    Right rotator cuff syndrome

## 2022-11-16 RX ORDER — MELOXICAM 15 MG/1
TABLET ORAL
Qty: 30 TABLET | Refills: 0 | Status: SHIPPED | OUTPATIENT
Start: 2022-11-16 | End: 2022-12-19

## 2022-12-19 RX ORDER — MELOXICAM 15 MG/1
15 TABLET ORAL DAILY
Qty: 30 TABLET | Refills: 0 | OUTPATIENT
Start: 2022-12-19

## 2022-12-19 RX ORDER — MELOXICAM 15 MG/1
TABLET ORAL
Qty: 30 TABLET | Refills: 0 | Status: SHIPPED | OUTPATIENT
Start: 2022-12-19 | End: 2023-01-18

## 2023-01-18 RX ORDER — MELOXICAM 15 MG/1
TABLET ORAL
Qty: 30 TABLET | Refills: 0 | Status: SHIPPED | OUTPATIENT
Start: 2023-01-18 | End: 2023-02-20

## 2023-01-29 DIAGNOSIS — Z30.41 ENCOUNTER FOR SURVEILLANCE OF CONTRACEPTIVE PILLS: ICD-10-CM

## 2023-01-30 RX ORDER — ETHYNODIOL DIACETATE AND ETHINYL ESTRADIOL 1 MG-35MCG
KIT ORAL
Qty: 28 TABLET | Refills: 0 | Status: SHIPPED | OUTPATIENT
Start: 2023-01-30 | End: 2023-02-20

## 2023-02-08 DIAGNOSIS — G89.29 CHRONIC BILATERAL LOW BACK PAIN, UNSPECIFIED WHETHER SCIATICA PRESENT: ICD-10-CM

## 2023-02-08 DIAGNOSIS — M54.50 CHRONIC BILATERAL LOW BACK PAIN, UNSPECIFIED WHETHER SCIATICA PRESENT: ICD-10-CM

## 2023-02-09 RX ORDER — CYCLOBENZAPRINE HCL 10 MG
TABLET ORAL
Qty: 30 TABLET | Refills: 0 | Status: SHIPPED | OUTPATIENT
Start: 2023-02-09 | End: 2023-03-20

## 2023-02-20 DIAGNOSIS — Z30.41 ENCOUNTER FOR SURVEILLANCE OF CONTRACEPTIVE PILLS: ICD-10-CM

## 2023-02-20 RX ORDER — ETHYNODIOL DIACETATE AND ETHINYL ESTRADIOL 1 MG-35MCG
1 KIT ORAL DAILY
Qty: 28 TABLET | Refills: 5 | Status: SHIPPED | OUTPATIENT
Start: 2023-02-20

## 2023-02-20 RX ORDER — MELOXICAM 15 MG/1
TABLET ORAL
Qty: 30 TABLET | Refills: 0 | Status: SHIPPED | OUTPATIENT
Start: 2023-02-20 | End: 2023-03-08

## 2023-02-20 RX ORDER — ETHYNODIOL DIACETATE AND ETHINYL ESTRADIOL 1 MG-35MCG
KIT ORAL
Qty: 28 TABLET | Refills: 0 | Status: SHIPPED | OUTPATIENT
Start: 2023-02-20 | End: 2023-02-20

## 2023-03-08 RX ORDER — MELOXICAM 15 MG/1
TABLET ORAL
Qty: 30 TABLET | Refills: 0 | Status: SHIPPED | OUTPATIENT
Start: 2023-03-08

## 2023-03-17 DIAGNOSIS — G89.29 CHRONIC BILATERAL LOW BACK PAIN, UNSPECIFIED WHETHER SCIATICA PRESENT: ICD-10-CM

## 2023-03-17 DIAGNOSIS — M54.50 CHRONIC BILATERAL LOW BACK PAIN, UNSPECIFIED WHETHER SCIATICA PRESENT: ICD-10-CM

## 2023-03-20 RX ORDER — CYCLOBENZAPRINE HCL 10 MG
TABLET ORAL
Qty: 30 TABLET | Refills: 0 | Status: SHIPPED | OUTPATIENT
Start: 2023-03-20

## 2023-04-19 RX ORDER — MELOXICAM 15 MG/1
TABLET ORAL
Qty: 30 TABLET | Refills: 0 | Status: SHIPPED | OUTPATIENT
Start: 2023-04-19

## 2023-05-12 ENCOUNTER — OFFICE VISIT (OUTPATIENT)
Dept: FAMILY MEDICINE CLINIC | Facility: CLINIC | Age: 43
End: 2023-05-12
Payer: COMMERCIAL

## 2023-05-12 VITALS
BODY MASS INDEX: 26.66 KG/M2 | OXYGEN SATURATION: 98 % | HEIGHT: 60 IN | DIASTOLIC BLOOD PRESSURE: 88 MMHG | SYSTOLIC BLOOD PRESSURE: 138 MMHG | HEART RATE: 97 BPM

## 2023-05-12 DIAGNOSIS — Z30.41 ENCOUNTER FOR SURVEILLANCE OF CONTRACEPTIVE PILLS: ICD-10-CM

## 2023-05-12 DIAGNOSIS — Z12.31 ENCOUNTER FOR SCREENING MAMMOGRAM FOR MALIGNANT NEOPLASM OF BREAST: ICD-10-CM

## 2023-05-12 DIAGNOSIS — Z00.00 ANNUAL PHYSICAL EXAM: Primary | ICD-10-CM

## 2023-05-12 PROCEDURE — 99396 PREV VISIT EST AGE 40-64: CPT | Performed by: FAMILY MEDICINE

## 2023-05-12 RX ORDER — NORGESTIMATE AND ETHINYL ESTRADIOL 0.25-0.035
1 KIT ORAL DAILY
Qty: 28 TABLET | Refills: 12 | Status: SHIPPED | OUTPATIENT
Start: 2023-05-12

## 2023-05-12 NOTE — PROGRESS NOTES
Chief Complaint  Contraception and Annual Exam    Subjective    History of Present Illness {CC  Problem List  Visit  Diagnosis   Encounters  Notes  Medications  Labs  Result Review Imaging  Media :23}     Yudith Urena presents to [facility] [place] for     Chief Complaint   Patient presents with   • Contraception   • Annual Exam     Yudith Urena is a 42 y.o. year old presenting to be seen for her annual exam.      Concerns: Low estrogen levels on testing done with outside provider, planning to start hormone therapy.    She exercises regularly: yes.  Healthy Diet:yes.  She wears her seat belt:yes.  She has concerns about domestic violence: no.    She is sexually active.    In the past 12 months there has not been new sexual partners.     She would not like to be screened for STD's at today's exam.   She is using OCPs for contraception.    Periods Regular: yes.    OB History        1    Para   1    Term   1            AB        Living           SAB        IAB        Ectopic        Molar        Multiple        Live Births                  She is taking Vit D and Calcium:yes  Last colonoscopy or FIT test: n/a  Last DEXA: n/a  Last PAP: uncertain exact date, due  Last Mammo: 21    Immunization status: up to date and documented.    The following portions of the patient's history were reviewed and updated as appropriate:problem list, current medications, allergies, past family history, past medical history, past social history and past surgical history.       Current Outpatient Medications:   •  cholecalciferol (VITAMIN D3) 25 MCG (1000 UT) tablet, Take 1 tablet by mouth., Disp: , Rfl:   •  cyclobenzaprine (FLEXERIL) 10 MG tablet, TAKE 1 TABLET BY MOUTH AT NIGHT AS NEEDED FOR MUSCLE SPASM, Disp: 30 tablet, Rfl: 0  •  ethynodiol-ethinyl estradiol (Kelnor ) 1-35 MG-MCG per tablet, Take 1 tablet by mouth Daily., Disp: 28 tablet,  "Rfl: 5  •  FLUoxetine (PROzac) 20 MG capsule, Take 1 capsule by mouth Daily., Disp: 90 capsule, Rfl: 3  •  meloxicam (MOBIC) 15 MG tablet, Take 1 tablet by mouth once daily with food, Disp: 30 tablet, Rfl: 0  •  SUMAtriptan (Imitrex) 25 MG tablet, Take 1 tablet by mouth 1 (One) Time for 1 dose. Take one tablet at onset of headache. May repeat dose one time in 2 hours if headache not relieved., Disp: 9 tablet, Rfl: 5     Objective       Vital Signs:   /88   Pulse 97   Ht 152.4 cm (60\")   SpO2 98%   BMI 26.66 kg/m²     Physical Exam  Vitals reviewed.   Constitutional:       General: She is not in acute distress.     Appearance: She is well-developed.   HENT:      Right Ear: Tympanic membrane and ear canal normal.      Left Ear: Tympanic membrane and ear canal normal.   Eyes:      Conjunctiva/sclera: Conjunctivae normal.      Pupils: Pupils are equal, round, and reactive to light.   Cardiovascular:      Rate and Rhythm: Normal rate and regular rhythm.      Heart sounds: Normal heart sounds. No murmur heard.  Pulmonary:      Effort: Pulmonary effort is normal. No respiratory distress.      Breath sounds: Normal breath sounds. No wheezing or rales.   Abdominal:      General: Bowel sounds are normal.      Palpations: Abdomen is soft.      Tenderness: There is no abdominal tenderness.   Musculoskeletal:      Cervical back: Neck supple.   Skin:     General: Skin is warm and dry.      Findings: No rash.   Neurological:      Mental Status: She is alert and oriented to person, place, and time.        Result Review :{ Labs  Result Review  Imaging  Med Tab  Media :23}   The following data was reviewed by: Angela Holden MD on 05/12/2023       Lab Results   Component Value Date    GLUCOSE 69 11/16/2021    BUN 14 11/16/2021    CREATININE 0.69 11/16/2021    BCR 20.3 11/16/2021    K 4.0 11/16/2021    CO2 24.9 11/16/2021    CALCIUM 9.2 11/16/2021    ALBUMIN 4.70 09/20/2021    BILITOT 0.7 03/03/2021    AST 18 " 03/03/2021    ALT 13 03/03/2021     Lab Results   Component Value Date    WBC 6.99 11/16/2021    HGB 14.1 11/16/2021    HCT 44.4 11/16/2021    MCV 94.3 11/16/2021     11/16/2021     Lab Results   Component Value Date    CHOL 164 03/03/2021    TRIG 74 03/03/2021    HDL 88 (H) 03/03/2021    LDL 62 03/03/2021              Assessment and Plan {CC Problem List  Visit Diagnosis  ROS  Review (Popup)  Health Maintenance  Quality  BestPractice  Medications  SmartSets  SnapShot Encounters  Media :23}   Diagnoses and all orders for this visit:    1. Annual physical exam (Primary)    2. Encounter for surveillance of contraceptive pills  -     norgestimate-ethinyl estradiol (ORTHO-CYCLEN) 0.25-35 MG-MCG per tablet; Take 1 tablet by mouth Daily.  Dispense: 28 tablet; Refill: 12    3. Encounter for screening mammogram for malignant neoplasm of breast  -     Mammo Screening Digital Tomosynthesis Bilateral With CAD; Future       Annual exam completed  Continue with healthy diet and regular exercise  Re-ordered labs to be done prior to/with next visit  Adjust to slightly different OCP, sent today  Discussed option of using progesterone only possibly so that estrogen can be added to hormone replacement regimen   planning to get vasectomy, and patient plans stop OCPs at some point in the near future      Follow Up {Instructions Charge Capture  Follow-up Communications :23}   Return in about 4 months (around 9/12/2023) for Recheck with pap smear.  Patient was given instructions and counseling regarding her condition or for health maintenance advice. Please see specific information pulled into the AVS if appropriate.          This document has been electronically signed by Angela Holden MD

## 2023-05-22 DIAGNOSIS — M54.50 CHRONIC BILATERAL LOW BACK PAIN, UNSPECIFIED WHETHER SCIATICA PRESENT: ICD-10-CM

## 2023-05-22 DIAGNOSIS — G89.29 CHRONIC BILATERAL LOW BACK PAIN, UNSPECIFIED WHETHER SCIATICA PRESENT: ICD-10-CM

## 2023-05-22 RX ORDER — CYCLOBENZAPRINE HCL 10 MG
TABLET ORAL
Qty: 30 TABLET | Refills: 0 | Status: SHIPPED | OUTPATIENT
Start: 2023-05-22 | End: 2023-05-22

## 2023-05-22 RX ORDER — CYCLOBENZAPRINE HCL 10 MG
10 TABLET ORAL NIGHTLY PRN
Qty: 30 TABLET | Refills: 11 | Status: SHIPPED | OUTPATIENT
Start: 2023-05-22

## 2023-05-22 RX ORDER — MELOXICAM 15 MG/1
TABLET ORAL
Qty: 30 TABLET | Refills: 0 | Status: SHIPPED | OUTPATIENT
Start: 2023-05-22

## 2023-06-06 DIAGNOSIS — F41.9 ANXIETY: ICD-10-CM

## 2023-06-06 RX ORDER — FLUOXETINE HYDROCHLORIDE 20 MG/1
20 CAPSULE ORAL DAILY
Qty: 90 CAPSULE | Refills: 3 | Status: SHIPPED | OUTPATIENT
Start: 2023-06-06

## 2023-06-06 RX ORDER — FLUOXETINE HYDROCHLORIDE 20 MG/1
20 CAPSULE ORAL DAILY
Qty: 30 CAPSULE | Refills: 0 | Status: SHIPPED | OUTPATIENT
Start: 2023-06-06 | End: 2023-06-06 | Stop reason: SDUPTHER

## 2023-07-28 ENCOUNTER — TELEPHONE (OUTPATIENT)
Dept: FAMILY MEDICINE CLINIC | Facility: CLINIC | Age: 43
End: 2023-07-28
Payer: COMMERCIAL

## 2023-07-28 NOTE — TELEPHONE ENCOUNTER
-Per Dr. Holden, Ms. Urena has been called with recent Screening Mammogram results & recommendations.  Continue current medications and follow-up as planned or sooner if any problems.       ---- Message from Angela Holden MD sent at 7/26/2023  6:16 PM CDT -----  Please call and let patient know that mammogram is normal.  Plan to repeat in 1 year.  Thanks, MARK Holden

## 2023-08-07 DIAGNOSIS — G43.809 OTHER MIGRAINE WITHOUT STATUS MIGRAINOSUS, NOT INTRACTABLE: ICD-10-CM

## 2023-08-07 RX ORDER — SUMATRIPTAN 25 MG/1
25 TABLET, FILM COATED ORAL ONCE
Qty: 9 TABLET | Refills: 5 | Status: SHIPPED | OUTPATIENT
Start: 2023-08-07 | End: 2023-08-07

## 2023-08-09 RX ORDER — MELOXICAM 15 MG/1
TABLET ORAL
Qty: 30 TABLET | Refills: 0 | Status: SHIPPED | OUTPATIENT
Start: 2023-08-09

## 2023-08-22 ENCOUNTER — PATIENT MESSAGE (OUTPATIENT)
Dept: FAMILY MEDICINE CLINIC | Facility: CLINIC | Age: 43
End: 2023-08-22
Payer: COMMERCIAL

## 2023-08-22 DIAGNOSIS — Z00.00 ANNUAL PHYSICAL EXAM: Primary | ICD-10-CM

## 2023-09-01 ENCOUNTER — LAB (OUTPATIENT)
Dept: LAB | Facility: HOSPITAL | Age: 43
End: 2023-09-01
Payer: COMMERCIAL

## 2023-09-01 DIAGNOSIS — Z00.00 ANNUAL PHYSICAL EXAM: ICD-10-CM

## 2023-09-01 DIAGNOSIS — R23.2 HOT FLASHES: ICD-10-CM

## 2023-09-01 PROCEDURE — 83002 ASSAY OF GONADOTROPIN (LH): CPT

## 2023-09-01 PROCEDURE — 80061 LIPID PANEL: CPT

## 2023-09-01 PROCEDURE — 36415 COLL VENOUS BLD VENIPUNCTURE: CPT

## 2023-09-01 PROCEDURE — 84402 ASSAY OF FREE TESTOSTERONE: CPT

## 2023-09-01 PROCEDURE — 80050 GENERAL HEALTH PANEL: CPT

## 2023-09-01 PROCEDURE — 83001 ASSAY OF GONADOTROPIN (FSH): CPT

## 2023-09-02 LAB
ALBUMIN SERPL-MCNC: 4.7 G/DL (ref 3.5–5.2)
ALBUMIN/GLOB SERPL: 2 G/DL
ALP SERPL-CCNC: 72 U/L (ref 39–117)
ALT SERPL W P-5'-P-CCNC: 17 U/L (ref 1–33)
ANION GAP SERPL CALCULATED.3IONS-SCNC: 12.5 MMOL/L (ref 5–15)
AST SERPL-CCNC: 25 U/L (ref 1–32)
BASOPHILS # BLD AUTO: 0.02 10*3/MM3 (ref 0–0.2)
BASOPHILS NFR BLD AUTO: 0.4 % (ref 0–1.5)
BILIRUB SERPL-MCNC: 0.5 MG/DL (ref 0–1.2)
BUN SERPL-MCNC: 15 MG/DL (ref 6–20)
BUN/CREAT SERPL: 16.5 (ref 7–25)
CALCIUM SPEC-SCNC: 9.4 MG/DL (ref 8.6–10.5)
CHLORIDE SERPL-SCNC: 104 MMOL/L (ref 98–107)
CHOLEST SERPL-MCNC: 199 MG/DL (ref 0–200)
CO2 SERPL-SCNC: 24.5 MMOL/L (ref 22–29)
CREAT SERPL-MCNC: 0.91 MG/DL (ref 0.57–1)
DEPRECATED RDW RBC AUTO: 39.8 FL (ref 37–54)
EGFRCR SERPLBLD CKD-EPI 2021: 79.4 ML/MIN/1.73
EOSINOPHIL # BLD AUTO: 0.07 10*3/MM3 (ref 0–0.4)
EOSINOPHIL NFR BLD AUTO: 1.4 % (ref 0.3–6.2)
ERYTHROCYTE [DISTWIDTH] IN BLOOD BY AUTOMATED COUNT: 12 % (ref 12.3–15.4)
FSH SERPL-ACNC: 45.2 MIU/ML
GLOBULIN UR ELPH-MCNC: 2.3 GM/DL
GLUCOSE SERPL-MCNC: 112 MG/DL (ref 65–99)
HCT VFR BLD AUTO: 39.9 % (ref 34–46.6)
HDLC SERPL-MCNC: 93 MG/DL (ref 40–60)
HGB BLD-MCNC: 13.7 G/DL (ref 12–15.9)
IMM GRANULOCYTES # BLD AUTO: 0.01 10*3/MM3 (ref 0–0.05)
IMM GRANULOCYTES NFR BLD AUTO: 0.2 % (ref 0–0.5)
LDLC SERPL CALC-MCNC: 97 MG/DL (ref 0–100)
LDLC/HDLC SERPL: 1.03 {RATIO}
LH SERPL-ACNC: 28.4 MIU/ML
LYMPHOCYTES # BLD AUTO: 1.39 10*3/MM3 (ref 0.7–3.1)
LYMPHOCYTES NFR BLD AUTO: 28.8 % (ref 19.6–45.3)
MCH RBC QN AUTO: 31.1 PG (ref 26.6–33)
MCHC RBC AUTO-ENTMCNC: 34.3 G/DL (ref 31.5–35.7)
MCV RBC AUTO: 90.7 FL (ref 79–97)
MONOCYTES # BLD AUTO: 0.31 10*3/MM3 (ref 0.1–0.9)
MONOCYTES NFR BLD AUTO: 6.4 % (ref 5–12)
NEUTROPHILS NFR BLD AUTO: 3.03 10*3/MM3 (ref 1.7–7)
NEUTROPHILS NFR BLD AUTO: 62.8 % (ref 42.7–76)
NRBC BLD AUTO-RTO: 0.2 /100 WBC (ref 0–0.2)
PLATELET # BLD AUTO: 212 10*3/MM3 (ref 140–450)
PMV BLD AUTO: 10.1 FL (ref 6–12)
POTASSIUM SERPL-SCNC: 4.7 MMOL/L (ref 3.5–5.2)
PROT SERPL-MCNC: 7 G/DL (ref 6–8.5)
RBC # BLD AUTO: 4.4 10*6/MM3 (ref 3.77–5.28)
SODIUM SERPL-SCNC: 141 MMOL/L (ref 136–145)
TRIGL SERPL-MCNC: 49 MG/DL (ref 0–150)
TSH SERPL DL<=0.05 MIU/L-ACNC: 0.66 UIU/ML (ref 0.27–4.2)
VLDLC SERPL-MCNC: 9 MG/DL (ref 5–40)
WBC NRBC COR # BLD: 4.83 10*3/MM3 (ref 3.4–10.8)

## 2023-09-04 NOTE — PROGRESS NOTES
Labs ok.  Hormonal labs getting into menopausal range.  Still waiting on testosterone - in process.  Will call with these results once available.  Vicente Holden

## 2023-09-05 ENCOUNTER — TELEPHONE (OUTPATIENT)
Dept: FAMILY MEDICINE CLINIC | Facility: CLINIC | Age: 43
End: 2023-09-05
Payer: COMMERCIAL

## 2023-09-05 NOTE — TELEPHONE ENCOUNTER
-Per Dr. Holden, Ms. Urena has been called with recent lab results & recommendations.  Continue current medications and follow-up as planned or sooner if any problems.     ---- Message from Angela Holden MD sent at 9/3/2023  8:28 PM CDT -----  Labs ok.  Hormonal labs getting into menopausal range.  Still waiting on testosterone - in process.  Will call with these results once available.  - MARK Holden

## 2023-09-05 NOTE — PROGRESS NOTES
Per Dr. Holden, Ms. Urena has been called with recent lab results & recommendations.  Continue current medications and follow-up as planned or sooner if any problems.

## 2023-09-10 LAB — TESTOST FREE SERPL-MCNC: 2 PG/ML (ref 0–4.2)

## 2023-09-11 RX ORDER — MELOXICAM 15 MG/1
TABLET ORAL
Qty: 30 TABLET | Refills: 0 | Status: SHIPPED | OUTPATIENT
Start: 2023-09-11

## 2023-09-15 ENCOUNTER — OFFICE VISIT (OUTPATIENT)
Dept: FAMILY MEDICINE CLINIC | Facility: CLINIC | Age: 43
End: 2023-09-15
Payer: COMMERCIAL

## 2023-09-15 VITALS
WEIGHT: 135 LBS | SYSTOLIC BLOOD PRESSURE: 130 MMHG | OXYGEN SATURATION: 97 % | BODY MASS INDEX: 26.5 KG/M2 | HEIGHT: 60 IN | DIASTOLIC BLOOD PRESSURE: 88 MMHG | HEART RATE: 85 BPM

## 2023-09-15 DIAGNOSIS — Z12.4 PAP SMEAR FOR CERVICAL CANCER SCREENING: ICD-10-CM

## 2023-09-15 DIAGNOSIS — Z12.11 SCREENING FOR MALIGNANT NEOPLASM OF COLON: ICD-10-CM

## 2023-09-15 DIAGNOSIS — Z00.00 ANNUAL PHYSICAL EXAM: Primary | ICD-10-CM

## 2023-09-15 DIAGNOSIS — E53.8 VITAMIN B12 DEFICIENCY: ICD-10-CM

## 2023-09-15 PROCEDURE — 99396 PREV VISIT EST AGE 40-64: CPT | Performed by: FAMILY MEDICINE

## 2023-09-15 PROCEDURE — 96372 THER/PROPH/DIAG INJ SC/IM: CPT | Performed by: FAMILY MEDICINE

## 2023-09-15 RX ORDER — CYANOCOBALAMIN 1000 UG/ML
1000 INJECTION, SOLUTION INTRAMUSCULAR; SUBCUTANEOUS
Status: SHIPPED | OUTPATIENT
Start: 2023-09-15

## 2023-09-15 RX ADMIN — CYANOCOBALAMIN 1000 MCG: 1000 INJECTION, SOLUTION INTRAMUSCULAR; SUBCUTANEOUS at 16:31

## 2023-09-15 NOTE — PROGRESS NOTES
Chief Complaint  Annual Exam    Subjective    History of Present Illness {CC  Problem List  Visit  Diagnosis   Encounters  Notes  Medications  Labs  Result Review Imaging  Media :23}     Yudith Urena presents to [facility] [place] for     Chief Complaint   Patient presents with    Annual Exam       Yudith Urena is a 43 y.o. year old presenting to be seen for her annual exam.      Concerns: None new.  Some problems with back pain, somewhat like before.  She is managing at this time.      She exercises regularly: yes, hot yoga  Healthy Diet:yes.  She wears her seat belt:yes.  She has concerns about domestic violence: no.    She is sexually active.    In the past 12 months there has not been new sexual partners.    She would not like to be screened for STD's at today's exam.   She is using nothing for contraception.    LMP: 2023  Periods Regular: no.    OB History          1    Para   1    Term   1            AB        Living             SAB        IAB        Ectopic        Molar        Multiple        Live Births   1              She is taking Vit D and Calcium:no  Last colonoscopy or FIT test: due  Last DEXA: n/a  Last PAP: due  Last Mammo: 2023    Immunization status: up to date and documented.    The following portions of the patient's history were reviewed and updated as appropriate:problem list, current medications, allergies, past family history, past medical history, past social history, and past surgical history.     Current Outpatient Medications:     cyclobenzaprine (FLEXERIL) 10 MG tablet, Take 1 tablet by mouth At Night As Needed for Muscle Spasms., Disp: 30 tablet, Rfl: 11    FLUoxetine (PROzac) 20 MG capsule, Take 1 capsule by mouth Daily., Disp: 90 capsule, Rfl: 3    meloxicam (MOBIC) 15 MG tablet, Take 1 tablet by mouth once daily with food, Disp: 30 tablet, Rfl: 0    cholecalciferol (VITAMIN D3) 25 MCG  "(1000 UT) tablet, Take 1 tablet by mouth., Disp: , Rfl:     norgestimate-ethinyl estradiol (ORTHO-CYCLEN) 0.25-35 MG-MCG per tablet, Take 1 tablet by mouth Daily., Disp: 28 tablet, Rfl: 12    SUMAtriptan (Imitrex) 25 MG tablet, Take 1 tablet by mouth 1 (One) Time for 1 dose. Take one tablet at onset of headache. May repeat dose one time in 2 hours if headache not relieved., Disp: 9 tablet, Rfl: 5     Objective       Vital Signs:   /88   Pulse 85   Ht 152.4 cm (60\")   Wt 61.2 kg (135 lb)   SpO2 97%   BMI 26.37 kg/m²     Physical Exam  Vitals reviewed. Exam conducted with a chaperone present.   Constitutional:       General: She is not in acute distress.     Appearance: She is well-developed.   HENT:      Right Ear: Tympanic membrane and ear canal normal.      Left Ear: Tympanic membrane and ear canal normal.   Eyes:      Conjunctiva/sclera: Conjunctivae normal.      Pupils: Pupils are equal, round, and reactive to light.   Cardiovascular:      Rate and Rhythm: Normal rate and regular rhythm.      Heart sounds: Normal heart sounds. No murmur heard.  Pulmonary:      Effort: Pulmonary effort is normal. No respiratory distress.      Breath sounds: Normal breath sounds. No wheezing or rales.   Chest:   Breasts:     Right: No mass, nipple discharge, skin change or tenderness.      Left: No mass, nipple discharge, skin change or tenderness.   Abdominal:      Palpations: Abdomen is soft.      Tenderness: There is no abdominal tenderness.   Musculoskeletal:      Cervical back: Neck supple.   Skin:     General: Skin is warm and dry.      Findings: No rash.   Neurological:      Mental Status: She is alert and oriented to person, place, and time.      Result Review :{ Labs  Result Review  Imaging  Med Tab  Media :23}   The following data was reviewed by: Angela Holden MD on 09/15/2023    Common labs          9/1/2023    13:03   Common Labs   Glucose 112    BUN 15    Creatinine 0.91    Sodium 141    Potassium " 4.7    Chloride 104    Calcium 9.4    Albumin 4.7    Total Bilirubin 0.5    Alkaline Phosphatase 72    AST (SGOT) 25    ALT (SGPT) 17    WBC 4.83    Hemoglobin 13.7    Hematocrit 39.9    Platelets 212    Total Cholesterol 199    Triglycerides 49    HDL Cholesterol 93    LDL Cholesterol  97                  Assessment and Plan {CC Problem List  Visit Diagnosis  ROS  Review (Popup)  Health Maintenance  Quality  BestPractice  Medications  SmartSets  SnapShot Encounters  Media :23}   Diagnoses and all orders for this visit:    1. Annual physical exam (Primary)    2. Screening for malignant neoplasm of colon  -     Cologuard - Stool, Per Rectum; Future    3. Pap smear for cervical cancer screening  -     LIQUID-BASED PAP SMEAR, [facility] ([facility])    4. Vitamin B12 deficiency  -     cyanocobalamin injection 1,000 mcg      Annual exam completed today  Discussed recommended screenings and immunizations  Due for colon cancer screening, discussed options and patient elects Cologuard -ordered today  Due for Pap smear, completed today  We will contact patient with results  Body mass index is 26.37 kg/m².  BMI is >= 25 and <30. (Overweight) The following options were offered after discussion;: exercise counseling/recommendations, nutrition counseling/recommendations, and keep with healthy lifestyle        Follow Up {Instructions Charge Capture  Follow-up Communications :23}   Return in about 1 year (around 9/15/2024) for Annual physical, Recheck.  Patient was given instructions and counseling regarding her condition or for health maintenance advice. Please see specific information pulled into the AVS if appropriate.            This document has been electronically signed by Angela Holden MD

## 2023-09-19 LAB — REF LAB TEST METHOD: NORMAL

## 2023-09-22 ENCOUNTER — TELEPHONE (OUTPATIENT)
Dept: FAMILY MEDICINE CLINIC | Facility: CLINIC | Age: 43
End: 2023-09-22
Payer: COMMERCIAL

## 2023-09-22 NOTE — TELEPHONE ENCOUNTER
-Per Dr. Holden, Ms. Urena has been called with recent Pap Smear & HPV  results & recommendations.  Continue current medications and follow-up as planned or sooner if any problems.     ---- Message from Angela Holden MD sent at 9/19/2023 11:34 PM CDT -----  Pap smear normal with negative HPV.  Next pap smear will be due in 5 years.  Thanks, MARK Holden

## 2023-09-22 NOTE — PROGRESS NOTES
Per Dr. Holden, Ms. Urena has been called with recent Pap Smear & HPV  results & recommendations.  Continue current medications and follow-up as planned or sooner if any problems.

## 2023-09-23 ENCOUNTER — PATIENT MESSAGE (OUTPATIENT)
Dept: FAMILY MEDICINE CLINIC | Facility: CLINIC | Age: 43
End: 2023-09-23

## 2023-09-23 DIAGNOSIS — G89.29 CHRONIC RIGHT SHOULDER PAIN: Primary | ICD-10-CM

## 2023-09-23 DIAGNOSIS — M25.511 CHRONIC RIGHT SHOULDER PAIN: Primary | ICD-10-CM
